# Patient Record
Sex: FEMALE | Race: WHITE | NOT HISPANIC OR LATINO | URBAN - METROPOLITAN AREA
[De-identification: names, ages, dates, MRNs, and addresses within clinical notes are randomized per-mention and may not be internally consistent; named-entity substitution may affect disease eponyms.]

---

## 2017-07-21 ENCOUNTER — EMERGENCY (EMERGENCY)
Facility: HOSPITAL | Age: 72
LOS: 1 days | Discharge: HOME | End: 2017-07-21
Attending: EMERGENCY MEDICINE
Payer: MEDICARE

## 2017-07-21 VITALS
DIASTOLIC BLOOD PRESSURE: 89 MMHG | SYSTOLIC BLOOD PRESSURE: 111 MMHG | HEART RATE: 101 BPM | HEIGHT: 62 IN | RESPIRATION RATE: 16 BRPM | TEMPERATURE: 99 F | WEIGHT: 119.93 LBS | OXYGEN SATURATION: 98 %
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
## 2017-07-21 DIAGNOSIS — Z90.710 ACQUIRED ABSENCE OF BOTH CERVIX AND UTERUS: Chronic | ICD-10-CM

## 2017-07-21 DIAGNOSIS — F15.90 OTHER STIMULANT USE, UNSPECIFIED, UNCOMPLICATED: ICD-10-CM

## 2017-07-21 DIAGNOSIS — Z90.710 ACQUIRED ABSENCE OF BOTH CERVIX AND UTERUS: ICD-10-CM

## 2017-07-21 DIAGNOSIS — S60.471A: ICD-10-CM

## 2017-07-21 DIAGNOSIS — F11.90 OPIOID USE, UNSPECIFIED, UNCOMPLICATED: ICD-10-CM

## 2017-07-21 PROCEDURE — 90471 IMMUNIZATION ADMIN: CPT

## 2017-07-21 PROCEDURE — 99283 EMERGENCY DEPT VISIT LOW MDM: CPT | Mod: 25

## 2017-07-21 PROCEDURE — 99284 EMERGENCY DEPT VISIT MOD MDM: CPT

## 2017-07-21 PROCEDURE — 90715 TDAP VACCINE 7 YRS/> IM: CPT

## 2017-07-21 RX ORDER — TETANUS TOXOID, REDUCED DIPHTHERIA TOXOID AND ACELLULAR PERTUSSIS VACCINE, ADSORBED 5; 2.5; 8; 8; 2.5 [IU]/.5ML; [IU]/.5ML; UG/.5ML; UG/.5ML; UG/.5ML
0.5 SUSPENSION INTRAMUSCULAR ONCE
Qty: 0 | Refills: 0 | Status: COMPLETED | OUTPATIENT
Start: 2017-07-21 | End: 2017-07-21

## 2017-07-21 RX ADMIN — TETANUS TOXOID, REDUCED DIPHTHERIA TOXOID AND ACELLULAR PERTUSSIS VACCINE, ADSORBED 0.5 MILLILITER(S): 5; 2.5; 8; 8; 2.5 SUSPENSION INTRAMUSCULAR at 09:45
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
## 2017-07-21 NOTE — ED PROVIDER NOTE - PHYSICAL EXAMINATION
SKIN: 0.25 x 0.25 superficial abrasion to L 2nd digit distal tip, no active bleeding, no erythema, no ttp.

## 2017-07-21 NOTE — ED PROVIDER NOTE - MUSCULOSKELETAL, MLM
Spine appears normal, range of motion of all joints in the hand is not limited, no muscle or joint tenderness
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
## 2017-07-21 NOTE — ED PROVIDER NOTE - SKIN, MLM
Skin normal color for race, warm, dry and intact. No evidence of rash. Skin normal color for race, warm, dry and intact. No evidence of rash. See above
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
## 2017-08-10 ENCOUNTER — EMERGENCY (EMERGENCY)
Facility: HOSPITAL | Age: 72
LOS: 1 days | Discharge: HOME | End: 2017-08-10
Attending: EMERGENCY MEDICINE
Payer: MEDICARE

## 2017-08-10 VITALS
HEIGHT: 62 IN | WEIGHT: 121.92 LBS | HEART RATE: 94 BPM | TEMPERATURE: 98 F | SYSTOLIC BLOOD PRESSURE: 122 MMHG | DIASTOLIC BLOOD PRESSURE: 68 MMHG | OXYGEN SATURATION: 97 % | RESPIRATION RATE: 20 BRPM

## 2017-08-10 VITALS
OXYGEN SATURATION: 99 % | SYSTOLIC BLOOD PRESSURE: 115 MMHG | HEART RATE: 95 BPM | DIASTOLIC BLOOD PRESSURE: 64 MMHG | TEMPERATURE: 98 F | RESPIRATION RATE: 18 BRPM

## 2017-08-10 DIAGNOSIS — Z90.710 ACQUIRED ABSENCE OF BOTH CERVIX AND UTERUS: ICD-10-CM

## 2017-08-10 DIAGNOSIS — Z88.8 ALLERGY STATUS TO OTHER DRUGS, MEDICAMENTS AND BIOLOGICAL SUBSTANCES STATUS: ICD-10-CM

## 2017-08-10 DIAGNOSIS — R10.9 UNSPECIFIED ABDOMINAL PAIN: ICD-10-CM

## 2017-08-10 DIAGNOSIS — M79.89 OTHER SPECIFIED SOFT TISSUE DISORDERS: ICD-10-CM

## 2017-08-10 DIAGNOSIS — Z90.710 ACQUIRED ABSENCE OF BOTH CERVIX AND UTERUS: Chronic | ICD-10-CM

## 2017-08-10 DIAGNOSIS — R06.02 SHORTNESS OF BREATH: ICD-10-CM

## 2017-08-10 LAB
ALBUMIN SERPL ELPH-MCNC: 3.2 G/DL — LOW (ref 3.5–5)
ALP SERPL-CCNC: 87 U/L — SIGNIFICANT CHANGE UP (ref 40–120)
ALT FLD-CCNC: 16 U/L DA — SIGNIFICANT CHANGE UP (ref 10–60)
ANION GAP SERPL CALC-SCNC: 1 MMOL/L — LOW (ref 5–17)
APPEARANCE UR: CLEAR — SIGNIFICANT CHANGE UP
AST SERPL-CCNC: 16 U/L — SIGNIFICANT CHANGE UP (ref 10–40)
BILIRUB SERPL-MCNC: 0.1 MG/DL — LOW (ref 0.2–1.2)
BILIRUB UR-MCNC: NEGATIVE — SIGNIFICANT CHANGE UP
BUN SERPL-MCNC: 16 MG/DL — SIGNIFICANT CHANGE UP (ref 7–18)
CALCIUM SERPL-MCNC: 8.8 MG/DL — SIGNIFICANT CHANGE UP (ref 8.4–10.5)
CHLORIDE SERPL-SCNC: 105 MMOL/L — SIGNIFICANT CHANGE UP (ref 96–108)
CO2 SERPL-SCNC: 32 MMOL/L — HIGH (ref 22–31)
COLOR SPEC: YELLOW — SIGNIFICANT CHANGE UP
CREAT SERPL-MCNC: 0.51 MG/DL — SIGNIFICANT CHANGE UP (ref 0.5–1.3)
DIFF PNL FLD: ABNORMAL
GLUCOSE SERPL-MCNC: 76 MG/DL — SIGNIFICANT CHANGE UP (ref 70–99)
GLUCOSE UR QL: NEGATIVE — SIGNIFICANT CHANGE UP
HCT VFR BLD CALC: 37.6 % — SIGNIFICANT CHANGE UP (ref 34.5–45)
HGB BLD-MCNC: 12.2 G/DL — SIGNIFICANT CHANGE UP (ref 11.5–15.5)
KETONES UR-MCNC: NEGATIVE — SIGNIFICANT CHANGE UP
LEUKOCYTE ESTERASE UR-ACNC: ABNORMAL
MCHC RBC-ENTMCNC: 32.6 GM/DL — SIGNIFICANT CHANGE UP (ref 32–36)
MCHC RBC-ENTMCNC: 33 PG — SIGNIFICANT CHANGE UP (ref 27–34)
MCV RBC AUTO: 101.1 FL — HIGH (ref 80–100)
NITRITE UR-MCNC: NEGATIVE — SIGNIFICANT CHANGE UP
NT-PROBNP SERPL-SCNC: 79 PG/ML — SIGNIFICANT CHANGE UP (ref 0–125)
PH UR: 8 — SIGNIFICANT CHANGE UP (ref 5–8)
PLATELET # BLD AUTO: 232 K/UL — SIGNIFICANT CHANGE UP (ref 150–400)
POTASSIUM SERPL-MCNC: 4.5 MMOL/L — SIGNIFICANT CHANGE UP (ref 3.5–5.3)
POTASSIUM SERPL-SCNC: 4.5 MMOL/L — SIGNIFICANT CHANGE UP (ref 3.5–5.3)
PROT SERPL-MCNC: 6.8 G/DL — SIGNIFICANT CHANGE UP (ref 6–8.3)
PROT UR-MCNC: NEGATIVE — SIGNIFICANT CHANGE UP
RBC # BLD: 3.72 M/UL — LOW (ref 3.8–5.2)
RBC # FLD: 12.3 % — SIGNIFICANT CHANGE UP (ref 10.3–14.5)
SODIUM SERPL-SCNC: 138 MMOL/L — SIGNIFICANT CHANGE UP (ref 135–145)
SP GR SPEC: 1.01 — SIGNIFICANT CHANGE UP (ref 1.01–1.02)
TROPONIN I SERPL-MCNC: <0.015 NG/ML — SIGNIFICANT CHANGE UP (ref 0–0.04)
UROBILINOGEN FLD QL: NEGATIVE — SIGNIFICANT CHANGE UP
WBC # BLD: 4 K/UL — SIGNIFICANT CHANGE UP (ref 3.8–10.5)
WBC # FLD AUTO: 4 K/UL — SIGNIFICANT CHANGE UP (ref 3.8–10.5)

## 2017-08-10 PROCEDURE — 85027 COMPLETE CBC AUTOMATED: CPT

## 2017-08-10 PROCEDURE — 36000 PLACE NEEDLE IN VEIN: CPT

## 2017-08-10 PROCEDURE — 84484 ASSAY OF TROPONIN QUANT: CPT

## 2017-08-10 PROCEDURE — 83880 ASSAY OF NATRIURETIC PEPTIDE: CPT

## 2017-08-10 PROCEDURE — 80053 COMPREHEN METABOLIC PANEL: CPT

## 2017-08-10 PROCEDURE — 71046 X-RAY EXAM CHEST 2 VIEWS: CPT

## 2017-08-10 PROCEDURE — 99285 EMERGENCY DEPT VISIT HI MDM: CPT | Mod: 25

## 2017-08-10 PROCEDURE — 71020: CPT | Mod: 26

## 2017-08-10 PROCEDURE — 81001 URINALYSIS AUTO W/SCOPE: CPT

## 2017-08-10 PROCEDURE — 99283 EMERGENCY DEPT VISIT LOW MDM: CPT

## 2017-08-10 PROCEDURE — 93005 ELECTROCARDIOGRAM TRACING: CPT

## 2017-08-10 RX ORDER — ONDANSETRON 8 MG/1
1 TABLET, FILM COATED ORAL
Qty: 28 | Refills: 0
Start: 2017-08-10 | End: 2017-08-17

## 2017-08-10 RX ORDER — CEFUROXIME AXETIL 250 MG
1 TABLET ORAL
Qty: 20 | Refills: 0
Start: 2017-08-10 | End: 2017-08-20

## 2017-08-10 RX ORDER — SODIUM CHLORIDE 9 MG/ML
1000 INJECTION INTRAMUSCULAR; INTRAVENOUS; SUBCUTANEOUS ONCE
Qty: 0 | Refills: 0 | Status: COMPLETED | OUTPATIENT
Start: 2017-08-10 | End: 2017-08-10

## 2017-08-10 RX ORDER — CEFUROXIME AXETIL 250 MG
250 TABLET ORAL EVERY 12 HOURS
Qty: 0 | Refills: 0 | Status: DISCONTINUED | OUTPATIENT
Start: 2017-08-10 | End: 2017-08-14

## 2017-08-10 RX ADMIN — SODIUM CHLORIDE 4000 MILLILITER(S): 9 INJECTION INTRAMUSCULAR; INTRAVENOUS; SUBCUTANEOUS at 16:05

## 2017-08-10 RX ADMIN — Medication 250 MILLIGRAM(S): at 16:46

## 2017-08-10 NOTE — ED PROVIDER NOTE - MEDICAL DECISION MAKING DETAILS
left flank cramping, dysuria, comfortable appearing, normal exam, ua and labs and cxr reassuring. pain improved with nsaids. will treat for uti given pt having dysuria even though ua neg. pt very comfortable and unlikely having kidney stone. no cough or fever, unlikely pna. discussed anticipatory guidance. follow up with pmd within one week

## 2017-08-10 NOTE — ED PROVIDER NOTE - OBJECTIVE STATEMENT
70 y/o F pt with no significant PMHx presents to ED c/o shortness of breath with swelling to her b/l lower extremities x last night. Pt is also c/o left sided flank pain and is concerned of a recurring UTI. Pt notes she has been Augmentin for her UTI without any relief. Pt denies fever, chills, cough, chest pain, palpitations, nausea, vomiting, diarrhea, abd pain, dysuria, urinary frequency, hematuria, or any other complaints. ALLERGIES: Antihistamines (Anaphylaxis), PHARMACY - [facility] on [facility] [id number] 72 y/o F pt with no significant PMHx presents to ED c/o shortness of breath with swelling to her b/l lower extremities x last night. Pt is also c/o left sided flank discomfort, dysuria and is concerned of a recurring UTI. Pt notes she has completed a course of Augmentin for her UTI without any relief. Pt denies fever, chills, cough, chest pain, palpitations, nausea, vomiting, diarrhea, hematuria, or any other complaints. ALLERGIES: Antihistamines PHARMACY - [facility] on [facility] [id number]

## 2017-08-10 NOTE — ED ADULT NURSE NOTE - OBJECTIVE STATEMENT
Patient presents to ED with swelling to bilateral lower legs, shortness of breath x yesterday. Patient also complains of UTI, was treated for UTI. Denies chest pain, fever, chills. Ambulates with steady gait. Patient is non diaphoretic. Breathing easy and unlabored, no use of accessory muscles. Family at bedside.

## 2017-08-10 NOTE — ED PROVIDER NOTE - CHPI ED SYMPTOMS NEG
no cough/no chills/no fever/no chest pain/no palpitations, no nausea, no vomiting, no diarrhea, no abd pain, no dysuria, no urinary frequency, no hematuria

## 2019-10-24 NOTE — ED ADULT NURSE NOTE - NS ED NURSE RECORD ANOTHER VITAL SIGN
Yes Medical Necessity Information: It is in your best interest to select a reason for this procedure from the list below. All of these items fulfill various CMS LCD requirements except lesion extends to a margin.

## 2019-11-01 ENCOUNTER — OUTPATIENT (OUTPATIENT)
Dept: OUTPATIENT SERVICES | Facility: HOSPITAL | Age: 74
LOS: 1 days | End: 2019-11-01
Payer: MEDICARE

## 2019-11-01 DIAGNOSIS — Z90.710 ACQUIRED ABSENCE OF BOTH CERVIX AND UTERUS: Chronic | ICD-10-CM

## 2019-11-01 PROCEDURE — G9001: CPT

## 2019-11-20 ENCOUNTER — INPATIENT (INPATIENT)
Facility: HOSPITAL | Age: 74
LOS: 2 days | Discharge: HOME | DRG: 935 | End: 2019-11-23
Attending: INTERNAL MEDICINE | Admitting: INTERNAL MEDICINE
Payer: MEDICARE

## 2019-11-20 VITALS
RESPIRATION RATE: 24 BRPM | SYSTOLIC BLOOD PRESSURE: 142 MMHG | WEIGHT: 115.08 LBS | DIASTOLIC BLOOD PRESSURE: 92 MMHG | TEMPERATURE: 101 F | HEART RATE: 132 BPM | OXYGEN SATURATION: 96 % | HEIGHT: 62 IN

## 2019-11-20 DIAGNOSIS — A41.9 SEPSIS, UNSPECIFIED ORGANISM: ICD-10-CM

## 2019-11-20 DIAGNOSIS — Z29.9 ENCOUNTER FOR PROPHYLACTIC MEASURES, UNSPECIFIED: ICD-10-CM

## 2019-11-20 DIAGNOSIS — F41.9 ANXIETY DISORDER, UNSPECIFIED: ICD-10-CM

## 2019-11-20 DIAGNOSIS — L03.115 CELLULITIS OF RIGHT LOWER LIMB: ICD-10-CM

## 2019-11-20 DIAGNOSIS — Z90.710 ACQUIRED ABSENCE OF BOTH CERVIX AND UTERUS: Chronic | ICD-10-CM

## 2019-11-20 DIAGNOSIS — T24.201A BURN OF SECOND DEGREE OF UNSPECIFIED SITE OF RIGHT LOWER LIMB, EXCEPT ANKLE AND FOOT, INITIAL ENCOUNTER: ICD-10-CM

## 2019-11-20 LAB
ALBUMIN SERPL ELPH-MCNC: 3.2 G/DL — LOW (ref 3.5–5)
ALP SERPL-CCNC: 101 U/L — SIGNIFICANT CHANGE UP (ref 40–120)
ALT FLD-CCNC: 18 U/L DA — SIGNIFICANT CHANGE UP (ref 10–60)
ANION GAP SERPL CALC-SCNC: 6 MMOL/L — SIGNIFICANT CHANGE UP (ref 5–17)
APPEARANCE UR: CLEAR — SIGNIFICANT CHANGE UP
APTT BLD: 28.7 SEC — SIGNIFICANT CHANGE UP (ref 27.5–36.3)
AST SERPL-CCNC: 17 U/L — SIGNIFICANT CHANGE UP (ref 10–40)
BACTERIA # UR AUTO: ABNORMAL /HPF
BASOPHILS # BLD AUTO: 0.02 K/UL — SIGNIFICANT CHANGE UP (ref 0–0.2)
BASOPHILS NFR BLD AUTO: 0.1 % — SIGNIFICANT CHANGE UP (ref 0–2)
BILIRUB SERPL-MCNC: 0.2 MG/DL — SIGNIFICANT CHANGE UP (ref 0.2–1.2)
BILIRUB UR-MCNC: NEGATIVE — SIGNIFICANT CHANGE UP
BUN SERPL-MCNC: 16 MG/DL — SIGNIFICANT CHANGE UP (ref 7–18)
CALCIUM SERPL-MCNC: 9.2 MG/DL — SIGNIFICANT CHANGE UP (ref 8.4–10.5)
CHLORIDE SERPL-SCNC: 105 MMOL/L — SIGNIFICANT CHANGE UP (ref 96–108)
CO2 SERPL-SCNC: 26 MMOL/L — SIGNIFICANT CHANGE UP (ref 22–31)
COLOR SPEC: YELLOW — SIGNIFICANT CHANGE UP
COMMENT - URINE: SIGNIFICANT CHANGE UP
CREAT SERPL-MCNC: 0.59 MG/DL — SIGNIFICANT CHANGE UP (ref 0.5–1.3)
DIFF PNL FLD: ABNORMAL
EOSINOPHIL # BLD AUTO: 0.03 K/UL — SIGNIFICANT CHANGE UP (ref 0–0.5)
EOSINOPHIL NFR BLD AUTO: 0.2 % — SIGNIFICANT CHANGE UP (ref 0–6)
EPI CELLS # UR: SIGNIFICANT CHANGE UP /HPF
GLUCOSE SERPL-MCNC: 112 MG/DL — HIGH (ref 70–99)
GLUCOSE UR QL: NEGATIVE — SIGNIFICANT CHANGE UP
HCT VFR BLD CALC: 42.8 % — SIGNIFICANT CHANGE UP (ref 34.5–45)
HGB BLD-MCNC: 13.9 G/DL — SIGNIFICANT CHANGE UP (ref 11.5–15.5)
IMM GRANULOCYTES NFR BLD AUTO: 0.3 % — SIGNIFICANT CHANGE UP (ref 0–1.5)
INR BLD: 1.06 RATIO — SIGNIFICANT CHANGE UP (ref 0.88–1.16)
KETONES UR-MCNC: NEGATIVE — SIGNIFICANT CHANGE UP
LACTATE SERPL-SCNC: 1.2 MMOL/L — SIGNIFICANT CHANGE UP (ref 0.7–2)
LEUKOCYTE ESTERASE UR-ACNC: NEGATIVE — SIGNIFICANT CHANGE UP
LYMPHOCYTES # BLD AUTO: 0.77 K/UL — LOW (ref 1–3.3)
LYMPHOCYTES # BLD AUTO: 4.8 % — LOW (ref 13–44)
MCHC RBC-ENTMCNC: 32.5 GM/DL — SIGNIFICANT CHANGE UP (ref 32–36)
MCHC RBC-ENTMCNC: 32.5 PG — SIGNIFICANT CHANGE UP (ref 27–34)
MCV RBC AUTO: 100 FL — SIGNIFICANT CHANGE UP (ref 80–100)
MONOCYTES # BLD AUTO: 1.11 K/UL — HIGH (ref 0–0.9)
MONOCYTES NFR BLD AUTO: 6.9 % — SIGNIFICANT CHANGE UP (ref 2–14)
NEUTROPHILS # BLD AUTO: 14.22 K/UL — HIGH (ref 1.8–7.4)
NEUTROPHILS NFR BLD AUTO: 87.7 % — HIGH (ref 43–77)
NITRITE UR-MCNC: NEGATIVE — SIGNIFICANT CHANGE UP
NRBC # BLD: 0 /100 WBCS — SIGNIFICANT CHANGE UP (ref 0–0)
PH UR: 6 — SIGNIFICANT CHANGE UP (ref 5–8)
PLATELET # BLD AUTO: 230 K/UL — SIGNIFICANT CHANGE UP (ref 150–400)
POTASSIUM SERPL-MCNC: 3.9 MMOL/L — SIGNIFICANT CHANGE UP (ref 3.5–5.3)
POTASSIUM SERPL-SCNC: 3.9 MMOL/L — SIGNIFICANT CHANGE UP (ref 3.5–5.3)
PROT SERPL-MCNC: 7.3 G/DL — SIGNIFICANT CHANGE UP (ref 6–8.3)
PROT UR-MCNC: NEGATIVE — SIGNIFICANT CHANGE UP
PROTHROM AB SERPL-ACNC: 11.8 SEC — SIGNIFICANT CHANGE UP (ref 10–12.9)
RBC # BLD: 4.28 M/UL — SIGNIFICANT CHANGE UP (ref 3.8–5.2)
RBC # FLD: 13.1 % — SIGNIFICANT CHANGE UP (ref 10.3–14.5)
RBC CASTS # UR COMP ASSIST: ABNORMAL /HPF (ref 0–2)
SODIUM SERPL-SCNC: 137 MMOL/L — SIGNIFICANT CHANGE UP (ref 135–145)
SP GR SPEC: 1.01 — SIGNIFICANT CHANGE UP (ref 1.01–1.02)
UROBILINOGEN FLD QL: NEGATIVE — SIGNIFICANT CHANGE UP
WBC # BLD: 16.2 K/UL — HIGH (ref 3.8–10.5)
WBC # FLD AUTO: 16.2 K/UL — HIGH (ref 3.8–10.5)
WBC UR QL: SIGNIFICANT CHANGE UP /HPF (ref 0–5)

## 2019-11-20 PROCEDURE — 73590 X-RAY EXAM OF LOWER LEG: CPT | Mod: 26,RT

## 2019-11-20 PROCEDURE — 93010 ELECTROCARDIOGRAM REPORT: CPT

## 2019-11-20 PROCEDURE — 99285 EMERGENCY DEPT VISIT HI MDM: CPT

## 2019-11-20 RX ORDER — ACETAMINOPHEN 500 MG
1000 TABLET ORAL ONCE
Refills: 0 | Status: COMPLETED | OUTPATIENT
Start: 2019-11-20 | End: 2019-11-20

## 2019-11-20 RX ORDER — SODIUM CHLORIDE 9 MG/ML
1000 INJECTION INTRAMUSCULAR; INTRAVENOUS; SUBCUTANEOUS ONCE
Refills: 0 | Status: COMPLETED | OUTPATIENT
Start: 2019-11-20 | End: 2019-11-20

## 2019-11-20 RX ORDER — ENOXAPARIN SODIUM 100 MG/ML
40 INJECTION SUBCUTANEOUS DAILY
Refills: 0 | Status: DISCONTINUED | OUTPATIENT
Start: 2019-11-20 | End: 2019-11-23

## 2019-11-20 RX ORDER — ACETAMINOPHEN 500 MG
650 TABLET ORAL EVERY 6 HOURS
Refills: 0 | Status: DISCONTINUED | OUTPATIENT
Start: 2019-11-20 | End: 2019-11-23

## 2019-11-20 RX ORDER — DIAZEPAM 5 MG
10 TABLET ORAL
Refills: 0 | Status: DISCONTINUED | OUTPATIENT
Start: 2019-11-20 | End: 2019-11-20

## 2019-11-20 RX ORDER — AMPICILLIN SODIUM AND SULBACTAM SODIUM 250; 125 MG/ML; MG/ML
1.5 INJECTION, POWDER, FOR SUSPENSION INTRAMUSCULAR; INTRAVENOUS EVERY 6 HOURS
Refills: 0 | Status: DISCONTINUED | OUTPATIENT
Start: 2019-11-20 | End: 2019-11-23

## 2019-11-20 RX ORDER — VANCOMYCIN HCL 1 G
750 VIAL (EA) INTRAVENOUS ONCE
Refills: 0 | Status: COMPLETED | OUTPATIENT
Start: 2019-11-20 | End: 2019-11-20

## 2019-11-20 RX ORDER — KETOROLAC TROMETHAMINE 30 MG/ML
15 SYRINGE (ML) INJECTION ONCE
Refills: 0 | Status: DISCONTINUED | OUTPATIENT
Start: 2019-11-20 | End: 2019-11-20

## 2019-11-20 RX ADMIN — Medication 1 APPLICATION(S): at 15:23

## 2019-11-20 RX ADMIN — SODIUM CHLORIDE 1000 MILLILITER(S): 9 INJECTION INTRAMUSCULAR; INTRAVENOUS; SUBCUTANEOUS at 13:11

## 2019-11-20 RX ADMIN — SODIUM CHLORIDE 1000 MILLILITER(S): 9 INJECTION INTRAMUSCULAR; INTRAVENOUS; SUBCUTANEOUS at 12:32

## 2019-11-20 RX ADMIN — Medication 400 MILLIGRAM(S): at 12:43

## 2019-11-20 RX ADMIN — Medication 1000 MILLIGRAM(S): at 13:11

## 2019-11-20 RX ADMIN — Medication 15 MILLIGRAM(S): at 15:53

## 2019-11-20 RX ADMIN — Medication 15 MILLIGRAM(S): at 15:16

## 2019-11-20 RX ADMIN — Medication 250 MILLIGRAM(S): at 15:16
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
## 2019-11-20 NOTE — H&P ADULT - NSHPSOCIALHISTORY_GEN_ALL_CORE
Pt lives with son. She is a retired teacher. She is a former smoker, Quit at the age of 28 years old.

## 2019-11-20 NOTE — H&P ADULT - SKIN COMMENTS
Redness on right leg around burn site
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
## 2019-11-20 NOTE — H&P ADULT - PROBLEM SELECTOR PLAN 4
IMPROVE VTE Individual Risk Assessment   RISK                                                          Points  [  ] Previous VTE                                                3  [  ] Thrombophilia                                             2  [  ] Lower limb paralysis                                    2        (unable to hold up >15 seconds)    [  ] Current Cancer                                             2         (within 6 months)  [  x] Immobilization > 24 hrs                              1  [  ] ICU/CCU stay > 24 hours                            1  [x  ] Age > 60                                                    1  IMPROVE VTE Score _________2  Lovenox for DVT prophylaxis
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
## 2019-11-20 NOTE — H&P ADULT - NEUROLOGICAL DETAILS
responds to pain/sensation intact/deep reflexes intact/cranial nerves intact/normal strength/responds to verbal commands/alert and oriented x 3

## 2019-11-21 DIAGNOSIS — Z71.89 OTHER SPECIFIED COUNSELING: ICD-10-CM

## 2019-11-21 LAB
24R-OH-CALCIDIOL SERPL-MCNC: 17.9 NG/ML — LOW (ref 30–80)
ANION GAP SERPL CALC-SCNC: 3 MMOL/L — LOW (ref 5–17)
BUN SERPL-MCNC: 10 MG/DL — SIGNIFICANT CHANGE UP (ref 7–18)
CALCIUM SERPL-MCNC: 9.1 MG/DL — SIGNIFICANT CHANGE UP (ref 8.4–10.5)
CHLORIDE SERPL-SCNC: 108 MMOL/L — SIGNIFICANT CHANGE UP (ref 96–108)
CHOLEST SERPL-MCNC: 150 MG/DL — SIGNIFICANT CHANGE UP (ref 10–199)
CO2 SERPL-SCNC: 29 MMOL/L — SIGNIFICANT CHANGE UP (ref 22–31)
CREAT SERPL-MCNC: 0.45 MG/DL — LOW (ref 0.5–1.3)
GLUCOSE SERPL-MCNC: 97 MG/DL — SIGNIFICANT CHANGE UP (ref 70–99)
HBA1C BLD-MCNC: 5.4 % — SIGNIFICANT CHANGE UP (ref 4–5.6)
HCT VFR BLD CALC: 37 % — SIGNIFICANT CHANGE UP (ref 34.5–45)
HDLC SERPL-MCNC: 65 MG/DL — SIGNIFICANT CHANGE UP
HGB BLD-MCNC: 11.9 G/DL — SIGNIFICANT CHANGE UP (ref 11.5–15.5)
LIPID PNL WITH DIRECT LDL SERPL: 76 MG/DL — SIGNIFICANT CHANGE UP
MAGNESIUM SERPL-MCNC: 2.1 MG/DL — SIGNIFICANT CHANGE UP (ref 1.6–2.6)
MCHC RBC-ENTMCNC: 32.1 PG — SIGNIFICANT CHANGE UP (ref 27–34)
MCHC RBC-ENTMCNC: 32.2 GM/DL — SIGNIFICANT CHANGE UP (ref 32–36)
MCV RBC AUTO: 99.7 FL — SIGNIFICANT CHANGE UP (ref 80–100)
NRBC # BLD: 0 /100 WBCS — SIGNIFICANT CHANGE UP (ref 0–0)
PHOSPHATE SERPL-MCNC: 3 MG/DL — SIGNIFICANT CHANGE UP (ref 2.5–4.5)
PLATELET # BLD AUTO: 183 K/UL — SIGNIFICANT CHANGE UP (ref 150–400)
POTASSIUM SERPL-MCNC: 4.4 MMOL/L — SIGNIFICANT CHANGE UP (ref 3.5–5.3)
POTASSIUM SERPL-SCNC: 4.4 MMOL/L — SIGNIFICANT CHANGE UP (ref 3.5–5.3)
RBC # BLD: 3.71 M/UL — LOW (ref 3.8–5.2)
RBC # FLD: 13.2 % — SIGNIFICANT CHANGE UP (ref 10.3–14.5)
SODIUM SERPL-SCNC: 140 MMOL/L — SIGNIFICANT CHANGE UP (ref 135–145)
TOTAL CHOLESTEROL/HDL RATIO MEASUREMENT: 2.3 RATIO — LOW (ref 3.3–7.1)
TRIGL SERPL-MCNC: 45 MG/DL — SIGNIFICANT CHANGE UP (ref 10–149)
TSH SERPL-MCNC: 0.36 UU/ML — SIGNIFICANT CHANGE UP (ref 0.34–4.82)
WBC # BLD: 9.02 K/UL — SIGNIFICANT CHANGE UP (ref 3.8–10.5)
WBC # FLD AUTO: 9.02 K/UL — SIGNIFICANT CHANGE UP (ref 3.8–10.5)

## 2019-11-21 RX ORDER — DIAZEPAM 5 MG
10 TABLET ORAL
Refills: 0 | Status: DISCONTINUED | OUTPATIENT
Start: 2019-11-21 | End: 2019-11-21

## 2019-11-21 RX ORDER — DIAZEPAM 5 MG
1 TABLET ORAL
Refills: 0 | Status: DISCONTINUED | OUTPATIENT
Start: 2019-11-21 | End: 2019-11-21

## 2019-11-21 RX ORDER — ERGOCALCIFEROL 1.25 MG/1
50000 CAPSULE ORAL
Refills: 0 | Status: DISCONTINUED | OUTPATIENT
Start: 2019-11-21 | End: 2019-11-23

## 2019-11-21 RX ORDER — SERTRALINE 25 MG/1
100 TABLET, FILM COATED ORAL DAILY
Refills: 0 | Status: DISCONTINUED | OUTPATIENT
Start: 2019-11-21 | End: 2019-11-21

## 2019-11-21 RX ADMIN — AMPICILLIN SODIUM AND SULBACTAM SODIUM 100 GRAM(S): 250; 125 INJECTION, POWDER, FOR SUSPENSION INTRAMUSCULAR; INTRAVENOUS at 00:34

## 2019-11-21 RX ADMIN — Medication 650 MILLIGRAM(S): at 08:10

## 2019-11-21 RX ADMIN — Medication 1 APPLICATION(S): at 06:07

## 2019-11-21 RX ADMIN — ENOXAPARIN SODIUM 40 MILLIGRAM(S): 100 INJECTION SUBCUTANEOUS at 13:09

## 2019-11-21 RX ADMIN — Medication 1 APPLICATION(S): at 22:33

## 2019-11-21 RX ADMIN — AMPICILLIN SODIUM AND SULBACTAM SODIUM 100 GRAM(S): 250; 125 INJECTION, POWDER, FOR SUSPENSION INTRAMUSCULAR; INTRAVENOUS at 06:07

## 2019-11-21 RX ADMIN — AMPICILLIN SODIUM AND SULBACTAM SODIUM 100 GRAM(S): 250; 125 INJECTION, POWDER, FOR SUSPENSION INTRAMUSCULAR; INTRAVENOUS at 18:53

## 2019-11-21 RX ADMIN — AMPICILLIN SODIUM AND SULBACTAM SODIUM 100 GRAM(S): 250; 125 INJECTION, POWDER, FOR SUSPENSION INTRAMUSCULAR; INTRAVENOUS at 13:09

## 2019-11-21 NOTE — PROGRESS NOTE ADULT - PROBLEM SELECTOR PLAN 2
Silver sulfadiazine cream  Wound care consult  Continue IV antibiotics Silver sulfadiazine cream  Wound care consult  Continue IV antibiotics, may switch to oral
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
## 2019-11-21 NOTE — PROGRESS NOTE ADULT - PROBLEM SELECTOR PLAN 3
Pt takes diazepam at home  Started on lorazepam, but as pt was drowsy will hold on lorazepam Pt takes diazepam and dextroamphetamine at home. Prescribed setraline, zurasidone, however does not take these 2  Started on lorazepam, but as pt was drowsy will hold on lorazepam as not available to inpatient

## 2019-11-21 NOTE — ADVANCED PRACTICE NURSE CONSULT - RECOMMEDATIONS
-Clean the R. Lower Leg wound with normal saline and apply skin prep to the surrounding skin  -Apply Silvadene Cream to the wound bed, cover with an ABD pad, and wrap with an ACE wrap Daily PRN  -Encourage the patient to reposition Q 2hrs using wedges or pillows

## 2019-11-21 NOTE — PATIENT PROFILE ADULT - CENTRAL VENOUS CATHETER/PICC LINE
Chief Complaint   Patient presents with    Vomiting    Diarrhea    Other     work note     Patient states she is here for vomiting (one night) and diarrhea. Patient states diarrhea since last Thursday; today no diarrhea. Patient states she had a somewhat normal bowel movement this morning. Patient states she will need a work note as she has been out sick for three days. no

## 2019-11-22 LAB
ANION GAP SERPL CALC-SCNC: 3 MMOL/L — LOW (ref 5–17)
BUN SERPL-MCNC: 7 MG/DL — SIGNIFICANT CHANGE UP (ref 7–18)
CALCIUM SERPL-MCNC: 8.8 MG/DL — SIGNIFICANT CHANGE UP (ref 8.4–10.5)
CHLORIDE SERPL-SCNC: 107 MMOL/L — SIGNIFICANT CHANGE UP (ref 96–108)
CO2 SERPL-SCNC: 29 MMOL/L — SIGNIFICANT CHANGE UP (ref 22–31)
CREAT SERPL-MCNC: 0.42 MG/DL — LOW (ref 0.5–1.3)
CULTURE RESULTS: SIGNIFICANT CHANGE UP
GLUCOSE SERPL-MCNC: 95 MG/DL — SIGNIFICANT CHANGE UP (ref 70–99)
HCT VFR BLD CALC: 35.8 % — SIGNIFICANT CHANGE UP (ref 34.5–45)
HGB BLD-MCNC: 11.5 G/DL — SIGNIFICANT CHANGE UP (ref 11.5–15.5)
MCHC RBC-ENTMCNC: 32.1 GM/DL — SIGNIFICANT CHANGE UP (ref 32–36)
MCHC RBC-ENTMCNC: 32.2 PG — SIGNIFICANT CHANGE UP (ref 27–34)
MCV RBC AUTO: 100.3 FL — HIGH (ref 80–100)
NRBC # BLD: 0 /100 WBCS — SIGNIFICANT CHANGE UP (ref 0–0)
PLATELET # BLD AUTO: 185 K/UL — SIGNIFICANT CHANGE UP (ref 150–400)
POTASSIUM SERPL-MCNC: 3.8 MMOL/L — SIGNIFICANT CHANGE UP (ref 3.5–5.3)
POTASSIUM SERPL-SCNC: 3.8 MMOL/L — SIGNIFICANT CHANGE UP (ref 3.5–5.3)
RBC # BLD: 3.57 M/UL — LOW (ref 3.8–5.2)
RBC # FLD: 13 % — SIGNIFICANT CHANGE UP (ref 10.3–14.5)
SODIUM SERPL-SCNC: 139 MMOL/L — SIGNIFICANT CHANGE UP (ref 135–145)
SPECIMEN SOURCE: SIGNIFICANT CHANGE UP
WBC # BLD: 7.52 K/UL — SIGNIFICANT CHANGE UP (ref 3.8–10.5)
WBC # FLD AUTO: 7.52 K/UL — SIGNIFICANT CHANGE UP (ref 3.8–10.5)

## 2019-11-22 RX ORDER — SENNA PLUS 8.6 MG/1
2 TABLET ORAL AT BEDTIME
Refills: 0 | Status: DISCONTINUED | OUTPATIENT
Start: 2019-11-22 | End: 2019-11-23

## 2019-11-22 RX ADMIN — SENNA PLUS 2 TABLET(S): 8.6 TABLET ORAL at 21:47

## 2019-11-22 RX ADMIN — Medication 1 APPLICATION(S): at 06:11

## 2019-11-22 RX ADMIN — Medication 1 APPLICATION(S): at 17:13

## 2019-11-22 RX ADMIN — AMPICILLIN SODIUM AND SULBACTAM SODIUM 100 GRAM(S): 250; 125 INJECTION, POWDER, FOR SUSPENSION INTRAMUSCULAR; INTRAVENOUS at 23:18

## 2019-11-22 RX ADMIN — ENOXAPARIN SODIUM 40 MILLIGRAM(S): 100 INJECTION SUBCUTANEOUS at 11:22

## 2019-11-22 RX ADMIN — AMPICILLIN SODIUM AND SULBACTAM SODIUM 100 GRAM(S): 250; 125 INJECTION, POWDER, FOR SUSPENSION INTRAMUSCULAR; INTRAVENOUS at 01:30

## 2019-11-22 RX ADMIN — AMPICILLIN SODIUM AND SULBACTAM SODIUM 100 GRAM(S): 250; 125 INJECTION, POWDER, FOR SUSPENSION INTRAMUSCULAR; INTRAVENOUS at 06:11

## 2019-11-22 RX ADMIN — AMPICILLIN SODIUM AND SULBACTAM SODIUM 100 GRAM(S): 250; 125 INJECTION, POWDER, FOR SUSPENSION INTRAMUSCULAR; INTRAVENOUS at 11:22

## 2019-11-22 RX ADMIN — ERGOCALCIFEROL 50000 UNIT(S): 1.25 CAPSULE ORAL at 11:21

## 2019-11-22 RX ADMIN — AMPICILLIN SODIUM AND SULBACTAM SODIUM 100 GRAM(S): 250; 125 INJECTION, POWDER, FOR SUSPENSION INTRAMUSCULAR; INTRAVENOUS at 17:13
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
## 2019-11-22 NOTE — PROGRESS NOTE ADULT - PROBLEM SELECTOR PLAN 3
Pt takes diazepam and dextroamphetamine at home. Prescribed sertraline, zurasidone, however does not take these 2  Started on lorazepam, but as pt was drowsy will hold on diazepam as not available to inpatient. dextroamphetamine not available to inpatient. Patient advised to take meds from home if needed, however patient will hold for now

## 2019-11-23 ENCOUNTER — TRANSCRIPTION ENCOUNTER (OUTPATIENT)
Age: 74
End: 2019-11-23

## 2019-11-23 VITALS
HEART RATE: 96 BPM | DIASTOLIC BLOOD PRESSURE: 77 MMHG | RESPIRATION RATE: 18 BRPM | OXYGEN SATURATION: 95 % | SYSTOLIC BLOOD PRESSURE: 114 MMHG | TEMPERATURE: 98 F

## 2019-11-23 LAB
HCT VFR BLD CALC: 38.5 % — SIGNIFICANT CHANGE UP (ref 34.5–45)
HGB BLD-MCNC: 12.4 G/DL — SIGNIFICANT CHANGE UP (ref 11.5–15.5)
MCHC RBC-ENTMCNC: 31.9 PG — SIGNIFICANT CHANGE UP (ref 27–34)
MCHC RBC-ENTMCNC: 32.2 GM/DL — SIGNIFICANT CHANGE UP (ref 32–36)
MCV RBC AUTO: 99 FL — SIGNIFICANT CHANGE UP (ref 80–100)
NRBC # BLD: 0 /100 WBCS — SIGNIFICANT CHANGE UP (ref 0–0)
PLATELET # BLD AUTO: 225 K/UL — SIGNIFICANT CHANGE UP (ref 150–400)
RBC # BLD: 3.89 M/UL — SIGNIFICANT CHANGE UP (ref 3.8–5.2)
RBC # FLD: 12.8 % — SIGNIFICANT CHANGE UP (ref 10.3–14.5)
WBC # BLD: 4.8 K/UL — SIGNIFICANT CHANGE UP (ref 3.8–10.5)
WBC # FLD AUTO: 4.8 K/UL — SIGNIFICANT CHANGE UP (ref 3.8–10.5)

## 2019-11-23 PROCEDURE — 83036 HEMOGLOBIN GLYCOSYLATED A1C: CPT

## 2019-11-23 PROCEDURE — 87086 URINE CULTURE/COLONY COUNT: CPT

## 2019-11-23 PROCEDURE — 80053 COMPREHEN METABOLIC PANEL: CPT

## 2019-11-23 PROCEDURE — 80048 BASIC METABOLIC PNL TOTAL CA: CPT

## 2019-11-23 PROCEDURE — 87040 BLOOD CULTURE FOR BACTERIA: CPT

## 2019-11-23 PROCEDURE — 83605 ASSAY OF LACTIC ACID: CPT

## 2019-11-23 PROCEDURE — 83735 ASSAY OF MAGNESIUM: CPT

## 2019-11-23 PROCEDURE — 82306 VITAMIN D 25 HYDROXY: CPT

## 2019-11-23 PROCEDURE — 36415 COLL VENOUS BLD VENIPUNCTURE: CPT

## 2019-11-23 PROCEDURE — 85027 COMPLETE CBC AUTOMATED: CPT

## 2019-11-23 PROCEDURE — 96374 THER/PROPH/DIAG INJ IV PUSH: CPT

## 2019-11-23 PROCEDURE — 80061 LIPID PANEL: CPT

## 2019-11-23 PROCEDURE — 85610 PROTHROMBIN TIME: CPT

## 2019-11-23 PROCEDURE — 81001 URINALYSIS AUTO W/SCOPE: CPT

## 2019-11-23 PROCEDURE — 84443 ASSAY THYROID STIM HORMONE: CPT

## 2019-11-23 PROCEDURE — 93005 ELECTROCARDIOGRAM TRACING: CPT

## 2019-11-23 PROCEDURE — 73590 X-RAY EXAM OF LOWER LEG: CPT

## 2019-11-23 PROCEDURE — 84100 ASSAY OF PHOSPHORUS: CPT

## 2019-11-23 PROCEDURE — 99285 EMERGENCY DEPT VISIT HI MDM: CPT | Mod: 25

## 2019-11-23 PROCEDURE — 85730 THROMBOPLASTIN TIME PARTIAL: CPT

## 2019-11-23 RX ADMIN — Medication 1 APPLICATION(S): at 06:02

## 2019-11-23 RX ADMIN — AMPICILLIN SODIUM AND SULBACTAM SODIUM 100 GRAM(S): 250; 125 INJECTION, POWDER, FOR SUSPENSION INTRAMUSCULAR; INTRAVENOUS at 06:02

## 2019-11-23 NOTE — PROGRESS NOTE ADULT - PROBLEM SELECTOR PLAN 3
Pt takes diazepam and dextroamphetamine at home. Prescribed sertraline, zurasidone, however does not take these 2  Started on lorazepam, but as pt was drowsy will hold on diazepam as not available to inpatient. dextroamphetamine not available to inpatient.
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
## 2019-11-23 NOTE — PROGRESS NOTE ADULT - ATTENDING COMMENTS
Patient seen and examined.   Cellulitis resolving.  D/C planning on Augmentin am tomorrow.
Patient seen and examined.   Cellulitis resolving.  D/C planning on Augmentin am tomorrow.
Patient seen and examined.   Continue with Unasyn. D/C planning in 1-2 days
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
## 2019-11-23 NOTE — DISCHARGE NOTE PROVIDER - NSDCMRMEDTOKEN_GEN_ALL_CORE_FT
dextroamphetamine 10 mg oral tablet: 4 tab(s) orally once a day  diazePAM 10 mg oral tablet: 1 tab(s) orally 2 times a day  K-PHOS tablet: 1 tab(s) orally 3 times a day  sertraline 100 mg oral tablet: 1 tab(s) orally 2 times a day  ziprasidone 20 mg oral capsule: 1 cap(s) orally 2 times a day, with meals amoxicillin-clavulanate 875 mg-125 mg oral tablet: 1 tab(s) orally 2 times a day   dextroamphetamine 10 mg oral tablet: 4 tab(s) orally once a day  diazePAM 10 mg oral tablet: 1 tab(s) orally 2 times a day  K-PHOS tablet: 1 tab(s) orally 3 times a day  sertraline 100 mg oral tablet: 1 tab(s) orally 2 times a day  silver sulfADIAZINE 1% topical cream: 1 application topically 2 times a day  ziprasidone 20 mg oral capsule: 1 cap(s) orally 2 times a day, with meals

## 2019-11-23 NOTE — DISCHARGE NOTE NURSING/CASE MANAGEMENT/SOCIAL WORK - PATIENT PORTAL LINK FT
You can access the FollowMyHealth Patient Portal offered by [facility] by registering at the following website: [email or web address]. By joining [employer]’s FollowMyHealth portal, you will also be able to view your health information using other applications (apps) compatible with our system.
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
## 2019-11-25 LAB
CULTURE RESULTS: SIGNIFICANT CHANGE UP
CULTURE RESULTS: SIGNIFICANT CHANGE UP
SPECIMEN SOURCE: SIGNIFICANT CHANGE UP
SPECIMEN SOURCE: SIGNIFICANT CHANGE UP

## 2019-11-30 ENCOUNTER — EMERGENCY (EMERGENCY)
Facility: HOSPITAL | Age: 74
LOS: 1 days | Discharge: HOME | End: 2019-11-30
Attending: EMERGENCY MEDICINE
Payer: MEDICARE

## 2019-11-30 VITALS
SYSTOLIC BLOOD PRESSURE: 149 MMHG | DIASTOLIC BLOOD PRESSURE: 90 MMHG | WEIGHT: 115.08 LBS | RESPIRATION RATE: 20 BRPM | TEMPERATURE: 98 F | HEART RATE: 113 BPM | HEIGHT: 62 IN | OXYGEN SATURATION: 97 %

## 2019-11-30 VITALS
TEMPERATURE: 97 F | HEART RATE: 98 BPM | OXYGEN SATURATION: 97 % | DIASTOLIC BLOOD PRESSURE: 68 MMHG | SYSTOLIC BLOOD PRESSURE: 121 MMHG | RESPIRATION RATE: 20 BRPM

## 2019-11-30 DIAGNOSIS — Z90.710 ACQUIRED ABSENCE OF BOTH CERVIX AND UTERUS: Chronic | ICD-10-CM

## 2019-11-30 LAB
ALBUMIN SERPL ELPH-MCNC: 3.4 G/DL — LOW (ref 3.5–5)
ALP SERPL-CCNC: 95 U/L — SIGNIFICANT CHANGE UP (ref 40–120)
ALT FLD-CCNC: 23 U/L DA — SIGNIFICANT CHANGE UP (ref 10–60)
ANION GAP SERPL CALC-SCNC: 3 MMOL/L — LOW (ref 5–17)
AST SERPL-CCNC: 24 U/L — SIGNIFICANT CHANGE UP (ref 10–40)
BASOPHILS # BLD AUTO: 0.03 K/UL — SIGNIFICANT CHANGE UP (ref 0–0.2)
BASOPHILS NFR BLD AUTO: 0.3 % — SIGNIFICANT CHANGE UP (ref 0–2)
BILIRUB SERPL-MCNC: 0.2 MG/DL — SIGNIFICANT CHANGE UP (ref 0.2–1.2)
BUN SERPL-MCNC: 16 MG/DL — SIGNIFICANT CHANGE UP (ref 7–18)
CALCIUM SERPL-MCNC: 9.2 MG/DL — SIGNIFICANT CHANGE UP (ref 8.4–10.5)
CHLORIDE SERPL-SCNC: 103 MMOL/L — SIGNIFICANT CHANGE UP (ref 96–108)
CO2 SERPL-SCNC: 33 MMOL/L — HIGH (ref 22–31)
CREAT SERPL-MCNC: 0.53 MG/DL — SIGNIFICANT CHANGE UP (ref 0.5–1.3)
EOSINOPHIL # BLD AUTO: 0.06 K/UL — SIGNIFICANT CHANGE UP (ref 0–0.5)
EOSINOPHIL NFR BLD AUTO: 0.6 % — SIGNIFICANT CHANGE UP (ref 0–6)
GLUCOSE SERPL-MCNC: 87 MG/DL — SIGNIFICANT CHANGE UP (ref 70–99)
HCT VFR BLD CALC: 38.2 % — SIGNIFICANT CHANGE UP (ref 34.5–45)
HGB BLD-MCNC: 12.6 G/DL — SIGNIFICANT CHANGE UP (ref 11.5–15.5)
IMM GRANULOCYTES NFR BLD AUTO: 0.4 % — SIGNIFICANT CHANGE UP (ref 0–1.5)
LACTATE SERPL-SCNC: 0.8 MMOL/L — SIGNIFICANT CHANGE UP (ref 0.7–2)
LYMPHOCYTES # BLD AUTO: 2.72 K/UL — SIGNIFICANT CHANGE UP (ref 1–3.3)
LYMPHOCYTES # BLD AUTO: 27.6 % — SIGNIFICANT CHANGE UP (ref 13–44)
MCHC RBC-ENTMCNC: 32.6 PG — SIGNIFICANT CHANGE UP (ref 27–34)
MCHC RBC-ENTMCNC: 33 GM/DL — SIGNIFICANT CHANGE UP (ref 32–36)
MCV RBC AUTO: 99 FL — SIGNIFICANT CHANGE UP (ref 80–100)
MONOCYTES # BLD AUTO: 0.69 K/UL — SIGNIFICANT CHANGE UP (ref 0–0.9)
MONOCYTES NFR BLD AUTO: 7 % — SIGNIFICANT CHANGE UP (ref 2–14)
NEUTROPHILS # BLD AUTO: 6.31 K/UL — SIGNIFICANT CHANGE UP (ref 1.8–7.4)
NEUTROPHILS NFR BLD AUTO: 64.1 % — SIGNIFICANT CHANGE UP (ref 43–77)
NRBC # BLD: 0 /100 WBCS — SIGNIFICANT CHANGE UP (ref 0–0)
PLATELET # BLD AUTO: 312 K/UL — SIGNIFICANT CHANGE UP (ref 150–400)
POTASSIUM SERPL-MCNC: 4 MMOL/L — SIGNIFICANT CHANGE UP (ref 3.5–5.3)
POTASSIUM SERPL-SCNC: 4 MMOL/L — SIGNIFICANT CHANGE UP (ref 3.5–5.3)
PROT SERPL-MCNC: 7.4 G/DL — SIGNIFICANT CHANGE UP (ref 6–8.3)
RBC # BLD: 3.86 M/UL — SIGNIFICANT CHANGE UP (ref 3.8–5.2)
RBC # FLD: 13 % — SIGNIFICANT CHANGE UP (ref 10.3–14.5)
SODIUM SERPL-SCNC: 139 MMOL/L — SIGNIFICANT CHANGE UP (ref 135–145)
WBC # BLD: 9.85 K/UL — SIGNIFICANT CHANGE UP (ref 3.8–10.5)
WBC # FLD AUTO: 9.85 K/UL — SIGNIFICANT CHANGE UP (ref 3.8–10.5)

## 2019-11-30 PROCEDURE — 73590 X-RAY EXAM OF LOWER LEG: CPT | Mod: 26,RT

## 2019-11-30 PROCEDURE — 83605 ASSAY OF LACTIC ACID: CPT

## 2019-11-30 PROCEDURE — 85027 COMPLETE CBC AUTOMATED: CPT

## 2019-11-30 PROCEDURE — 36415 COLL VENOUS BLD VENIPUNCTURE: CPT

## 2019-11-30 PROCEDURE — 99285 EMERGENCY DEPT VISIT HI MDM: CPT

## 2019-11-30 PROCEDURE — 87040 BLOOD CULTURE FOR BACTERIA: CPT

## 2019-11-30 PROCEDURE — 80053 COMPREHEN METABOLIC PANEL: CPT

## 2019-11-30 PROCEDURE — 73590 X-RAY EXAM OF LOWER LEG: CPT

## 2019-11-30 PROCEDURE — 99283 EMERGENCY DEPT VISIT LOW MDM: CPT | Mod: 25

## 2019-11-30 RX ORDER — SODIUM CHLORIDE 9 MG/ML
1000 INJECTION INTRAMUSCULAR; INTRAVENOUS; SUBCUTANEOUS ONCE
Refills: 0 | Status: COMPLETED | OUTPATIENT
Start: 2019-11-30 | End: 2019-11-30

## 2019-11-30 RX ADMIN — SODIUM CHLORIDE 1000 MILLILITER(S): 9 INJECTION INTRAMUSCULAR; INTRAVENOUS; SUBCUTANEOUS at 16:11

## 2019-11-30 NOTE — ED PROVIDER NOTE - PATIENT PORTAL LINK FT
You can access the FollowMyHealth Patient Portal offered by [facility] by registering at the following website: [email or web address]. By joining [employer]’s FollowMyHealth portal, you will also be able to view your health information using other applications (apps) compatible with our system.

## 2019-11-30 NOTE — ED PROVIDER NOTE - CARE PLAN
Principal Discharge DX:	Wound Principal Discharge DX:	Skin ulcer of right lower leg with fat layer exposed

## 2019-11-30 NOTE — ED PROVIDER NOTE - NSFOLLOWUPCLINICS_GEN_ALL_ED_FT
[facility]  Podiatry/Wound Care  [address] [place] [address]  Phone: [phone]  Fax: [phone]  Follow Up Time: 4-6 Days

## 2019-11-30 NOTE — ED PROVIDER NOTE - PROGRESS NOTE DETAILS
Patient is resting comfortably, NAD. HR normal. Normal WBC count. wound with healthy granulation tissue. No evidence of infection. Nurse provided wound care teaching. Information given to cc to arrange f/u with wound clinic. f/u with PMD in 1-2 days. Return to the ED immediately if getting worse, not improving, or if having any new or troubling symptoms.
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
## 2019-11-30 NOTE — ED PROVIDER NOTE - SKIN WOUND DESCRIPTION
R leg 3x10cm area of healthy granulated tissue, no surrounding erythema or purulence, leg NV intact distal to injury
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
## 2021-11-10 NOTE — ED ADULT TRIAGE NOTE - ESI TRIAGE ACUITY LEVEL, MLM
INFECTIOUS DISEASES CONSULT NOTE    Patient:  Tawny Shin 69 y.o. female  ROOM # 349/1  YOB: 1952  MRN: [id number]  [facility]:  [id number]  Admit date: 11/4/2021   Admitting Physician: Bandar Shea, *  Primary Care Physician: Davon Urrutia MD  REFERRING PROVIDER: Bandar Shea,*      REASON FOR CONSULTATION :Abx management prior to DC      HISTORY OF PRESENT ILLNESS: Patient is a pleasant 69-year-old immunocompromised female who has been followed chronically by Dr. Ohara for her multiple infectious diseases issue including osteomyelitis/discitis L1-L2, right femur infection with methicillin susceptible Staphylococcus aureus and prolonged bacteremia as below..    She has had previous prolonged methicillin susceptible Staphylococcus aureus bacteremia and subsequently completed approximately 12 weeks total of IV antibiotic therapy I believe in May 2021.    The patient was admitted to [facility] in October and had methicillin susceptible Staph aureus bacteremia at that time.  She was actually admitted secondary to a fall.  She was noted to have a fluid collection on the right trochanter and previously had surgery per Dr. Turner  The area was aspirated and appeared serosanguineous however culture subsequently grew methicillin susceptible Staphylococcus aureus.  This was not felt to be the primary source of the staph aureus.  Patient cleared her bacteremia much quicker and when I actually last saw her she was going to go home on IV antibiotic therapy, however she subsequently was transferred to [facility] for rehab.    She developed some increased pain in her hip and weakness of her lower extremities, had MRI obtained that was concerning for epidural hematoma and cord compression.  She was subsequently transferred back to Dr. Shea services.  Her anticoagulation had been held and there has been no surgical intervention.        Past Medical History:  "  Diagnosis Date   • Age-related osteoporosis with current pathological fracture 5/27/2020   • Arthritis    • Asthma    • Bilateral bunions 12/23/2020   • Cancer ([facility])    • Cardiac pacemaker syndrome 12/23/2020    Overview:  - heart block - implanted 11/16   • Charcot's joint of foot, left 12/23/2020   • Chronic deep vein thrombosis (DVT) of right lower extremity ([facility]) 6/23/2021   • Chronic pain syndrome 6/22/2021   • Chronic sinusitis    • COPD (chronic obstructive pulmonary disease) ([facility])    • Coronary artery disease    • 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 12/23/2020   • Elevated cholesterol    • Eustachian tube dysfunction    • Heart disease    • Herpes simplex    • History of transfusion    • Hyperlipidemia    • Hypertension    • Hypothyroidism 12/23/2020   • Intrinsic asthma 12/23/2020   • Knee dislocation    • Labral tear of right hip joint    • Laryngitis sicca    • Laryngitis, chronic    • Left carotid bruit 3/9/2016   • MI (myocardial infarction) ([facility])    • Myalgia due to statin 6/25/2019   • Open wound of right hip 9/14/2021   • Osteomyelitis of right femur ([facility]) 7/6/2021   • Otorrhea    • Pacemaker 11/17/2016   • Primary osteoarthritis of left knee 12/23/2020   • Psoriasis vulgaris 12/23/2020   • S/P coronary artery stent placement 3/9/2016   • Sensorineural hearing loss    • Seropositive rheumatoid arthritis of multiple sites ([facility]) 12/27/2019    Overview:  -myochrysine '93-'96 -methotrexate '96--->11/98;r/s  restarted 2/99--> 8/14 (anemia) -sulfasalazine- not effective -penicillamine 6/98-->10/98; no effect -leflunomide 11/98--> - Humira '13-->didn't take - Enbrel 12/14-->3/15- no effect!   Last Assessment & Plan:  - \"aching all over\" because she had to be off her anti-rheumatic drugs for 2 weeks in preparation for her R knee surgery - he   • Sick sinus syndrome ([facility]) 12/27/2019   • Sjogren's disease ([facility])    • Spondylolisthesis of lumbar region 1/17/2018   • Syncope, recurrent 2/8/2021     Past Surgical " 3 History:   Procedure Laterality Date   • A-V CARDIAC PACEMAKER INSERTION  2016   • ATRIAL CARDIAC PACEMAKER INSERTION     • CARDIAC CATHETERIZATION     • CATARACT EXTRACTION     • CERVICAL CORPECTOMY N/A 3/3/2021    Procedure: CERVICAL 6 CORPECTOMY WITH TITANIUM CAGE WITH NEURO MONITORING;  Surgeon: Bandar Shea MD;  Location:  [facility] OR;  Service: Neurosurgery;  Laterality: N/A;   • COLONOSCOPY  11/08/2011    One fold in the ascending colon which showed ulcer otherwise normal exam   • COLONOSCOPY  11/12/2004    Normal exam repeat in five years   • CORONARY ANGIOPLASTY WITH STENT PLACEMENT      X 2; 2013 & 2014   • ENDOSCOPY  07/10/2014    Normal exam   • FLAP LEG Right 9/14/2021    Procedure: RIGHT GLUTEAL FASCIOCUTANEOUS ADVANCEMENT FLAP AND RIGHT TENSOR FASCIAL JESSICA FLAP;  Surgeon: Amadeo Turner MD;  Location:  [facility] OR;  Service: Plastics;  Laterality: Right;   • HIP ABDUCTION TENOTOMY BILATERAL Right 1/14/2021    Procedure: RIGHT HIP GLUTEUS MEDLUS / MINIMUS REPAIR, POSSIBLE ACHILLES ALLOGRAFT;  Surgeon: Nino Carlson MD;  Location:  [facility];  Service: Orthopedics;  Laterality: Right;   • INCISION AND DRAINAGE HIP Right 2/9/2021    Procedure: HIP INCISION AND DRAINAGE;  Surgeon: Nino Carlson MD;  Location:  [facility];  Service: Orthopedics;  Laterality: Right;   • INCISION AND DRAINAGE LEG Right 10/24/2021    Procedure: INCISION AND DRAINAGE LOWER EXTREMITY;  Surgeon: Amadeo Turner MD;  Location:  [facility];  Service: Plastics;  Laterality: Right;   • INCISION AND DRAINAGE OF WOUND Right 7/8/2021    Procedure: INCISION AND DRAINAGE WOUND RIGHT HIP;  Surgeon: James Huntley MD;  Location:  [facility];  Service: Orthopedics;  Laterality: Right;   • JOINT REPLACEMENT     • KYPHOPLASTY WITH BIOPSY Bilateral 10/26/2021    Procedure: THOARCIC 12 KYPHOPLASTY WITH BIOPSY;  Surgeon: Bandar Shea MD;  Location:  [facility];  Service: Neurosurgery;  Laterality: Bilateral;   • LEG  DEBRIDEMENT Right 9/14/2021    Procedure: DEBRIDEMENT OF RIGHT HIP WOUND, RIGHT GLUTEAL FASCIOCUTANEOUS ADVANCEMENT FLAP AND RIGHT TENSOR FASCIAL JESSICA FLAP;  Surgeon: Amadeo Turner MD;  Location:  [facility];  Service: Plastics;  Laterality: Right;   • LUMBAR DISCECTOMY Right 3/23/2021    Procedure: LUMBAR DISCECTOMY MICRO, Lumbar 1/2 right;  Surgeon: Bandar Shea MD;  Location:  [facility];  Service: Neurosurgery;  Laterality: Right;   • LUMBAR FUSION N/A 1/19/2018    Procedure: L3-4,L4-5 DECOMPRESSION, POSTERIOR SPINAL FUSION WITH INSTRUMENTATION;  Surgeon: Fortino Oropeza MD;  Location:  [facility];  Service:    • LUMBAR LAMINECTOMY WITH FUSION Left 1/17/2018    Procedure: LEFT L3-4 L4-5 LATERAL LUMBAR INTERBODY FUSION;  Surgeon: Fortino Oropeza MD;  Location:  [facility];  Service:    • MYRINGOTOMY W/ TUBES  09/04/2014    TUBES NO LONGER IN PLACE   • OTHER SURGICAL HISTORY      total knee was infected twice so hardware was removed and spacers were placed   • REPLACEMENT TOTAL KNEE Right      Family History   Problem Relation Age of Onset   • Cancer Mother    • Heart disease Father      Social History     Socioeconomic History   • Marital status:    Tobacco Use   • Smoking status: Never Smoker   • Smokeless tobacco: Never Used   Substance and Sexual Activity   • Alcohol use: No   • Drug use: Never   • Sexual activity: Defer           Current Meds:     Current Facility-Administered Medications   Medication Dose Route Frequency Provider Last Rate Last Admin   • acetaminophen (TYLENOL) tablet 650 mg  650 mg Oral Q4H PRN Bandar Shea MD   650 mg at 11/09/21 1207    Or   • acetaminophen (TYLENOL) 160 MG/5ML solution 650 mg  650 mg Oral Q4H PRN Bandar Shea MD        Or   • acetaminophen (TYLENOL) suppository 650 mg  650 mg Rectal Q4H PRN Bandar Shea MD       • sennosides-docusate (PERICOLACE) 8.6-50 MG per tablet 2 tablet  2 tablet Oral BID Bandar Shea  MD Carrillo   2 tablet at 11/10/21 0900    And   • polyethylene glycol (MIRALAX) packet 17 g  17 g Oral Daily PRN Bandar Shea MD        And   • bisacodyl (DULCOLAX) EC tablet 5 mg  5 mg Oral Daily PRN Bandar Shea MD        And   • bisacodyl (DULCOLAX) suppository 10 mg  10 mg Rectal Daily PRN Bandar Shea MD       • carvedilol (COREG) tablet 25 mg  25 mg Oral BID With Meals Bandar Shea MD   25 mg at 11/10/21 0900   • ceFAZolin in 0.9% normal saline (ANCEF) IVPB solution 2 g  2 g Intravenous Q8H Bandar Shea  mL/hr at 11/10/21 0900 2 g at 11/10/21 0900   • docusate sodium (COLACE) capsule 100 mg  100 mg Oral BID Bandar Shea MD   100 mg at 11/10/21 0900   • ferrous sulfate tablet 325 mg  325 mg Oral Daily With Breakfast Bandar Shea MD   325 mg at 11/10/21 0900   • fluticasone (FLONASE) 50 MCG/ACT nasal spray 1 spray  1 spray Nasal BID Bandar Shea MD   1 spray at 11/10/21 0910   • folic acid (FOLVITE) tablet 1 mg  1 mg Oral Daily Bandar Shea MD   1 mg at 11/10/21 0900   • furosemide (LASIX) tablet 40 mg  40 mg Oral Daily Bandar Shea MD   40 mg at 11/10/21 0900   • heparin (porcine) 5000 UNIT/ML injection 5,000 Units  5,000 Units Subcutaneous Q12H Bandar Shea MD   5,000 Units at 11/10/21 0859   • HYDROcodone-acetaminophen (NORCO)  MG per tablet 1 tablet  1 tablet Oral Q4H PRN Bandar Shea MD   1 tablet at 11/10/21 1431   • HYDROcodone-acetaminophen (NORCO) 7.5-325 MG per tablet 1 tablet  1 tablet Oral Q4H PRN Bandar Shea MD   1 tablet at 11/09/21 2036   • hydrocortisone-bacitracin-zinc oxide-nystatin (MAGIC BARRIER) ointment 1 application  1 application Topical PRN Polly Senior, APRN       • influenza vac split quad (FLUZONE,FLUARIX,AFLURIA,FLULAVAL) injection 0.5 mL  0.5 mL Intramuscular During Hospitalization Bandar Shea MD       • isosorbide  mononitrate (IMDUR) 24 hr tablet 60 mg  60 mg Oral BID Bandar Shea MD   60 mg at 11/10/21 0900   • levothyroxine (SYNTHROID, LEVOTHROID) tablet 75 mcg  75 mcg Oral Q AM Bandar Shea MD   75 mcg at 11/10/21 0615   • lidocaine (LIDODERM) 5 % 1 patch  1 patch Transdermal Daily PRN Bandar Shea MD   1 patch at 11/05/21 1216   • magnesium oxide (MAG-OX) tablet 400 mg  400 mg Oral Daily Bandar Shea MD   400 mg at 11/10/21 0900   • nitroglycerin (NITROSTAT) SL tablet 0.4 mg  0.4 mg Sublingual Q5 Min PRN Bandar Shea MD       • ondansetron (ZOFRAN) tablet 4 mg  4 mg Oral Q6H PRN Bandar Shea MD        Or   • ondansetron (ZOFRAN) injection 4 mg  4 mg Intravenous Q6H PRN Bandar Shea MD       • pantoprazole (PROTONIX) EC tablet 40 mg  40 mg Oral Daily Bandar Shea MD   40 mg at 11/10/21 0859   • polyethylene glycol (MIRALAX) packet 17 g  17 g Oral Daily Bandar Shea MD   17 g at 11/10/21 0900   • potassium chloride (MICRO-K) CR capsule 20 mEq  20 mEq Oral BID Bandar Shea MD   20 mEq at 11/10/21 0859   • saccharomyces boulardii (FLORASTOR) capsule 250 mg  250 mg Oral Daily Bandar Shea MD   250 mg at 11/10/21 0900   • sodium chloride 0.9 % flush 10 mL  10 mL Intravenous Q12H Bandar Shea MD   10 mL at 11/09/21 2040   • sodium chloride 0.9 % flush 10 mL  10 mL Intravenous PRN Bandar Shea MD       • sucralfate (CARAFATE) tablet 1 g  1 g Oral 4x Daily AC & at Bedtime Bandar Shea MD   1 g at 11/10/21 1109   • thiamine (VITAMIN B-1) tablet 250 mg  250 mg Oral Daily Bandar Shea MD   250 mg at 11/10/21 0900   • traMADol (ULTRAM) tablet 100 mg  100 mg Oral TID Bandar Shea MD   100 mg at 11/10/21 0859   • valACYclovir (VALTREX) tablet 500 mg  500 mg Oral Q24H Bandar Shea MD   500 mg at 11/10/21 0859       Home Meds:  Prior to Admission  medications    Medication Sig Start Date End Date Taking? Authorizing Provider   acetaminophen (TYLENOL) 325 MG tablet Take 650 mg by mouth Every 6 (Six) Hours As Needed for Mild Pain .   Yes Leila Daly MD   apixaban (ELIQUIS) 5 MG tablet tablet Take 5 mg by mouth 2 (Two) Times a Day.   Yes Leila Daly MD   aspirin 81 MG EC tablet Take 81 mg by mouth Daily.   Yes Leila Daly MD   carvedilol (COREG) 25 MG tablet Take 1 tablet by mouth 2 (Two) Times a Day With Meals. 7/1/21  Yes Davon Urrutia MD   ceFAZolin in 0.9% normal saline (ANCEF) 2 GM/ 100 mL solution IVPB Infuse 100 mL into a venous catheter Every 8 (Eight) Hours for 19 days. Indications: Bacteria in the Blood 10/23/21 11/11/21 Yes Nayan Hale DO   Diclofenac Sodium (VOLTAREN) 1 % gel gel Apply 4 g topically to the appropriate area as directed 4 (Four) Times a Day As Needed.   Yes Leila Daly MD   docusate sodium 100 MG capsule Take 1 capsule by mouth 2 (Two) Times a Day. 7/13/21  Yes Christos Min DO   famotidine (PEPCID) 40 MG tablet Take 40 mg by mouth Daily.   Yes Leila Daly MD   ferrous sulfate 325 (65 Fe) MG tablet Take 325 mg by mouth Daily With Breakfast.   Yes Leila Daly MD   fluticasone (FLONASE) 50 MCG/ACT nasal spray 1 spray into the nostril(s) as directed by provider Daily As Needed. 5/1/21  Yes Leila Daly MD   folic acid (FOLVITE) 1 MG tablet Take 1 mg by mouth Daily.   Yes Leila Daly MD   furosemide (LASIX) 40 MG tablet Take 40 mg by mouth Daily.   Yes Leila Daly MD   HYDROcodone-acetaminophen (NORCO) 7.5-325 MG per tablet Take 1 tablet by mouth Every 4 (Four) Hours As Needed for Moderate Pain . 10/28/21  Yes Nayan Hale DO   isosorbide mononitrate (IMDUR) 60 MG 24 hr tablet Take 1 tablet by mouth 2 (Two) Times a Day. 3/26/21  Yes Taiwo Prado APRN   levothyroxine (SYNTHROID, LEVOTHROID) 75 MCG tablet Take 1 tablet by mouth  Daily. 6/22/21  Yes Davon Urrutia MD   lidocaine (LIDODERM) 5 % Place 1 patch on the skin as directed by provider Daily As Needed. Remove & Discard patch within 12 hours or as directed by MD    Yes Leila Daly MD   magnesium oxide (MAG-OX) 400 MG tablet Take 400 mg by mouth Daily. 4/12/21  Yes Leila Daly MD   multivitamin with minerals tablet tablet Take 1 tablet by mouth Daily. 3/6/21  Yes Taiwo Prado APRN   Naloxegol Oxalate (Movantik) 25 MG tablet Take 25 mg by mouth Every Morning.   Yes Leila Daly MD   nitroglycerin (Nitrostat) 0.4 MG SL tablet Place 1 tablet under the tongue Every 5 (Five) Minutes As Needed for Chest Pain. Take no more than 3 doses in 15 minutes. 3/26/21  Yes Taiwo Prado APRN   pantoprazole (PROTONIX) 40 MG EC tablet Take 1 tablet by mouth Daily. 6/22/21  Yes Davon Urrutia MD   polyethylene glycol (polyethylene glycol) 17 g packet Take 17 g by mouth Daily. 10/29/21  Yes Nayan Hale DO   potassium chloride (KLOR-CON) 20 MEQ packet Take 20 mEq by mouth Daily.   Yes Leila Daly MD   saccharomyces boulardii (Florastor) 250 MG capsule Take 1 capsule by mouth Daily. 7/1/21  Yes Davon Urrutia MD   salsalate (DISALCID) 500 MG tablet Take 500 mg by mouth Daily. Take 5 tablets by mouth Monday, Wednesdays and Fridays, and 4 tablets on Tuesdays, Thursday and Sundays   Yes Leila Daly MD   sucralfate (CARAFATE) 1 g tablet Take 1 g by mouth 4 (Four) Times a Day Before Meals & at Bedtime.   Yes Leila Daly MD   thiamine1 (VITAMIN B1) 250 MG tablet Take 250 mg by mouth Daily.   Yes Leila Daly MD   traMADol (ULTRAM) 50 MG tablet Take 50 mg by mouth 2 (Two) Times a Day.   Yes Leila Daly MD   valACYclovir (VALTREX) 500 MG tablet Take 1 tablet by mouth Daily. Indications: chronic suppressive therapy 6/22/21  Yes Davon Urrutia MD            Allergies   Allergen Reactions   • Atorvastatin Other (See  "Comments)     LEG CRAMPS     • Amoxicillin Rash   • Escitalopram Rash   • Nabumetone Rash   • Niacin Er Rash   • Penicillins Rash   • Simvastatin Rash       Review of Systems   Constitutional: Negative for fever.   HENT: Negative for congestion.    Eyes: Negative for photophobia.   Respiratory: Negative for cough.    Cardiovascular: Negative for chest pain.   Gastrointestinal: Negative for vomiting.   Musculoskeletal: Positive for back pain.   Skin: Positive for wound.   Allergic/Immunologic: Positive for immunocompromised state.   Neurological: Positive for weakness.         Vital Signs:  /45 (BP Location: Left arm, Patient Position: Lying)   Pulse 69   Temp 98.1 °F (36.7 °C) (Oral)   Resp 18   Ht 167.6 cm (66\")   Wt 71.6 kg (157 lb 14.4 oz)   LMP  (LMP Unknown)   SpO2 95%   BMI 25.49 kg/m²   Temp (24hrs), Av.2 °F (36.8 °C), Min:98 °F (36.7 °C), Max:98.6 °F (37 °C)      Physical Exam   General: The patient is nontoxic-appearing lying in bed on her left side in no acute distress  HEENT: Sclera anicteric.  Eyeglasses in place  Respiratory: Effort even and unlabored.  She is not conversationally dyspneic  Abdomen: Soft, nontender, nondistended  Extremities: Right hip dressing is in place.  This was peeled back and packing was in place along incision line.  No surrounding cellulitis or active drainage.  No foul odor  Neuro: Alert, oriented, speech clear.  Symmetric and full strength upper extremities.  Patient was able to wiggle toes for either lower extremities.  See PT note for further info per Nicole, PT  Psych: Pleasant cooperative        Line/IV site: Midlineupper arm right, condition patent and no redness    Results Review:    I reviewed the patient's new clinical results.    Lab Results:  CBC:   Lab Results   Lab 21  0711 21  0902   WBC 4.85 5.39   HEMOGLOBIN 7.3* 7.2*   HEMATOCRIT 23.7* 23.6*   PLATELETS 247 258       CMP:   Lab Results   Lab 21  0711 21  09   SODIUM " 135* 135*   POTASSIUM 4.8 4.6   CHLORIDE 100 100   CO2 29.0 27.0   BUN 27* 25*   CREATININE 0.71 0.87   CALCIUM 9.1 8.8   BILIRUBIN 0.5 0.5   ALK PHOS 151* 154*   ALT (SGPT) <5 <5   AST (SGOT) 23 21   GLUCOSE 100* 116*       Lab Results (last 72 hours)     ** No results found for the last 72 hours. **          Estimated Creatinine Clearance: 62.7 mL/min (by C-G formula based on SCr of 0.87 mg/dL).    Culture Results:    Microbiology Results (last 10 days)     ** No results found for the last 240 hours. **         10/24/2021 0629 10/29/2021 0645 Blood Culture With BREONNA - Blood, Arm, Left [[id number]]   Blood from Arm, Left    Final result Component Value   Blood Culture No growth at 5 days              10/23/2021 1316 10/26/2021 0650 Body Fluid Culture - Body Fluid, Thigh, Right [[id number]]    (Abnormal)   Body Fluid from Thigh, Right    Final result Component Value   Body Fluid Culture Rare Staphylococcus aureus Abnormal    Gram Stain Many (4+) WBCs per low power field    Rare (1+) Gram positive cocci         Susceptibility     Staphylococcus aureus     LICHA     Gentamicin <=0.5  Susceptible     Oxacillin 0.5  Susceptible     Rifampin <=0.5  Susceptible     Vancomycin <=0.5  Susceptible               Linear View          10/23/2021 1101 10/28/2021 1200 Blood Culture With BREONNA - Blood, Hand, Left [[id number]]   Blood from Hand, Left    Final result Component Value   Blood Culture No growth at 5 days              10/21/2021 1234 10/24/2021 0634 Blood Culture - Blood, Arm, Right [[id number]]    (Abnormal)   Blood from Arm, Right    Final result Component Value   Blood Culture Staphylococcus aureus Critical     Infectious disease consultation is highly recommended to rule out distant foci of infection.   Isolated from Aerobic and Anaerobic Bottles   Gram Stain Aerobic Bottle Gram positive cocci in clusters Critical     Anaerobic Bottle Gram positive cocci in clusters Critical          Susceptibility     Staphylococcus aureus      LICHA     Gentamicin <=0.5  Susceptible     Oxacillin 0.5  Susceptible     Rifampin <=0.5  Susceptible     Vancomycin <=0.5  Susceptible               Linear View          10/21/2021 1234 10/22/2021 0420 Blood Culture ID, PCR - Blood, Arm, Right [[id number]]    (Abnormal)   Blood from Arm, Right    Final result Component Value   BCID, PCR Staph aureus. mecA/C and MREJ (methicillin resistance gene) NOT detected. Identification by BCID2 PCR Abnormal               10/21/2021 1233 10/24/2021 0635 Blood Culture - Blood, Arm, Right [[id number]]    (Abnormal)   Blood from Arm, Right    Final result Component Value   Blood Culture Staphylococcus aureus Critical     Infectious disease consultation is highly recommended to rule out distant foci of infection.   Isolated from Aerobic and Anaerobic Bottles   Gram Stain Aerobic Bottle Gram positive cocci in clusters Critical     Anaerobic Bottle Gram positive cocci in clusters Critical               10/21/2021 1009 10/24/2021 1030 Urine Culture - Urine, Urine, Catheter In/Out [[id number]]    (Abnormal)   Urine, Catheter In/Out    Final result Component Value   Urine Culture >100,000 CFU/mL Escherichia coli Abnormal          Susceptibility     Escherichia coli     LICHA     Amikacin <=2  Susceptible     Ampicillin >=32  Resistant     Ampicillin + Sulbactam 8  Susceptible     Cefazolin <=4  Susceptible     Cefepime <=1  Susceptible     Ceftazidime <=1  Susceptible     Ceftriaxone <=1  Susceptible     Gentamicin >=16  Resistant     Levofloxacin >=8  Resistant     Nitrofurantoin <=16  Susceptible     Piperacillin + Tazobactam <=4  Susceptible     Tetracycline >=16  Resistant     Tobramycin 8  Intermediate     Trimethoprim + Sulfamethoxazole >=320  Resistant               Linear View                Radiology:   Imaging Results (Last 72 Hours)     ** No results found for the last 72 hours. **        CT Lumbar Spine Without Contrast [[id number]] Rick as Reviewed   Order Status: Completed  Collected: 11/04/21 0912    Updated: 11/04/21 0941   Narrative:     EXAMINATION: CT LUMBAR SPINE WO CONTRAST-        11/4/2021 8:34 AM CDT       HISTORY: Chronic back pain. Previous surgery and fractures.       In order to have a CT radiation dose as low as reasonably achievable   Automated Exposure Control was utilized for adjustment of the mA and/or   KV according to patient size.       DLP in mGycm= 478.       Axial, sagittal, and coronal noncontrast lumbar spine CT.       Severe diffuse demineralization.   Intact sacrum and SI joints.   No scoliosis.       L3-4 and L4-5 discectomy.   Bilateral pedicle screw hardware fusion at L3, L4, and L5.   Hardware is intact.       High-grade disc space narrowing with endplate spurring and degenerative   endplate sclerosis at L1-2.       Chronic T12 compression fracture with kyphoplasty treatment.   Approximately 40% loss of height.       Lordotic curvature is appropriate.   No significant malalignment.       Right laminectomy defect at L2.   Posterior element resection at L4.       Intact sacrum.   Symmetric SI joints.       Aortic calcification with no aneurysm.       No acute lumbar spine fracture is seen.       Evidence of disc bulge with spinal canal stenosis at L1-2.   Decompression based on right laminectomy defect.       Disc protrusion, facet disease, and endplate spurring with mild   foraminal narrowing at L2-3.       L3-4 discectomy level with streak artifact.   Appearance of moderate right foraminal stenosis based on spurring.       L4-5 discectomy level with streak artifact.   No significant stenosis is seen.       L5-S1 shows facet hypertrophy. Mild relative foraminal narrowing on the   right.       Summary:   1. Extensive postsurgical and degenerative change.   2. Severe demineralization.   3. No acute fracture is seen.              MRI report from [facility] November 3, 2021  Impression  1.  Fluid collection in the spinal canal from T11 vertebral  body down to L1-L2 disc facing causing significant cord compression this is most severe at the L1 level.  This was felt to be consistent with hematoma in the anterior epidural space.  2.  Prior kyphoplasty at T12  3.  Central disc herniation T1-T2.  Severe narrowing of L1-L2 disc  4.  Postoperative and degenerative changes in the remainder of the lumbar spine    Assessment/Plan       Epidural hematoma (HCC)    Severe malnutrition (HCC)      IMPRESSION:  1. Methicillin susceptible Staphylococcus aureus bacteremia-recurrent after prolonged bacteremia several months prior.  2. T12 compression fracture status post kyphoplasty  3. Epidural hematoma  4. Candida intertrigo of the intergluteal cleft with pressure injury right and left buttocks.  5. Fluid collection right greater trochanter status post I&D of seroma per Dr. Turner previous admission.  Cultures positive for methicillin susceptible Staphylococcus aureus felt to be consistent with infected seroma seeded from bacteremia as opposed to primary abscess      RECOMMENDATION:   · We will plan a total of 4 weeks therapy with cefazolin 2 g IV every 12 given the fact she has had recurrent methicillin susceptible Staphylococcus aureus bacteremia.  Today is day #18 of 28  · Dressing changes per Dr. Turner  · Continue physical therapy  · Await plans for transfer to rehab facility.        Maggie Bang MD  11/10/21  14:41 CST

## 2022-01-14 NOTE — ED PROVIDER NOTE - CPE EDP ENMT NORM
[facility] - Cardiology Stepdown  Interventional Cardiology  Consult Note    Patient Name: Debbie Metcalf  MRN: [id number]  Admission Date: 1/13/2022  Hospital Length of Stay: 1 days  Code Status: Full Code   Attending Provider: Reno Souza*  Consulting Provider: Slava Chowdary MD  Primary Care Physician: MEÑO Abdalla  Principal Problem:NSTEMI (non-ST elevated myocardial infarction)    Patient information was obtained from patient, past medical records and ER records.     Inpatient consult to Interventional Cardiology  Consult performed by: Slava Chowdary MD  Consult ordered by: Anat Singh PA-C  Reason for consult: NSTEMI  Assessment/Recommendations: Please see A/P        Subjective:     Reason for consult: NSTEMI     HPI:  Patient is a 75 year old female with history of T2DM (01/14/2022 Hgb a1c 9%), diabetic neuropathy, HTN, HLD (LDL 72 mg /dL on atorvastatin 20 mg PO at home), chronic back pain 2/2 scoliosis who presented to the ED on 01/13/2022 with left arm pain radiating to substernal chest discomfort; reports being woken up from afternoon nap ~ 2:30 bpm with acute onset pain starting in her left arm and radiating to her left shoulder and left chest. She rated the pain as 8/10, substernal/borderline left breast wall, sudden onset after lunch with a friend. Associated with tingling in her left arm. Blood glucose was 360 mg/dL (baseline 180-200 mg/dL) and she took 25U insulin. Her blood glucose came down but her pain remained and she called EMS to bring to the ER. Enroute, she was given SL NTG and aspirin, which helped alleviate the pain.       Patient currently denies any chest discomfort, jaw pain, arm pain, shortness of breath. No prior history of chest pain. Does not exercise regularly but reports dyspnea on exertion for the last 2 months with walking up flights of stairs or running after grandchildren. Started developing fluid in legs bilaterally 2 years ago for which she was  started on aldactone. No known prior TTE.     In the ED, /90, HR 88 bpm, sating 99% on RA. CBC wnl, CMP wnl. CXR wnl. ECG notable for sinus rhythm, 1st AVB, Q-waves septal and lateral leads. Cr 1.1. Hgb 13.4 g/dL Plt 269 k cells/uL. No prior ECGs available for comparison. On bedside Echo by overnight fellow, LVEF appeared to be 55-60% with no WMA. RV wnl. Troponin trend 2.9 --> 16.25 --> 23.8. CT head without contrast obtained by primary over concerns regarding dysarthric speech, remote lacunar infarcts. Patient was loaded with  mg PO x 1 by EMS and Plavix 300 mg PO x 1 in ED.      No reported family history of premature CAD.   Denies tobacco use, alcohol or other drug use. Worked as a  prior to [employer]. Lives in [place]. Has 3 children. Lives alone. Ambulates with cane due to recent thoracic vertebral injury.       Past Medical History:   Diagnosis Date    Diabetes mellitus        Past Surgical History:   Procedure Laterality Date    EYE SURGERY      gender reaffirmation         Review of patient's allergies indicates:  No Known Allergies    PTA Medications   Medication Sig    atorvastatin (LIPITOR) 20 MG tablet Take 20 mg by mouth once daily.    estradioL (ESTRACE) 2 MG tablet Take 2 mg by mouth 2 (two) times a day.    insulin aspart U-100 (NOVOLOG) 100 unit/mL injection Inject into the skin 3 (three) times daily before meals.    insulin detemir U-100 (LEVEMIR) 100 unit/mL injection Inject 100 Units into the skin every evening.    LANCETS MISC by Misc.(Non-Drug; Combo Route) route.    minoxidil (ROGAINE TOP) Apply topically.    sodium chloride 5% (SAKINA 128) 5 % ophthalmic solution Place 1 drop into both eyes 3 (three) times daily.    spironolactone (ALDACTONE) 25 MG tablet Take 25 mg by mouth once daily.     Family History     Problem Relation (Age of Onset)    Diabetes Father    Lung cancer Mother    Stroke Father        Tobacco Use    Smoking status: Never Smoker     Smokeless tobacco: Never Used   Substance and Sexual Activity    Alcohol use: Never    Drug use: Never    Sexual activity: Not on file     Review of Systems   Constitutional: Negative for chills and fever.   Cardiovascular: Negative for chest pain, orthopnea and palpitations.   Respiratory: Negative for cough and shortness of breath.    Gastrointestinal: Negative for abdominal pain.   Neurological: Negative for dizziness, headaches and light-headedness.   Psychiatric/Behavioral: Negative for altered mental status.     Objective:     Vital Signs (Most Recent):  Temp: 98 °F (36.7 °C) (01/14/22 0412)  Pulse: 76 (01/14/22 0412)  Resp: 16 (01/14/22 0412)  BP: (!) 154/81 (01/14/22 0412)  SpO2: 97 % (01/14/22 0412) Vital Signs (24h Range):  Temp:  [97.5 °F (36.4 °C)-98 °F (36.7 °C)] 98 °F (36.7 °C)  Pulse:  [76-94] 76  Resp:  [16-22] 16  SpO2:  [95 %-100 %] 97 %  BP: (134-182)/(75-90) 154/81     Weight: 103 kg (227 lb)  Body mass index is 29.95 kg/m².    SpO2: 97 %  O2 Device (Oxygen Therapy): room air    No intake or output data in the 24 hours ending 01/14/22 0549    Lines/Drains/Airways     Peripheral Intravenous Line                 Peripheral IV - Single Lumen 01/13/22 1910 20 G Right Antecubital <1 day                Physical Exam  Vitals reviewed.   Constitutional:       General: She is not in acute distress.     Appearance: Normal appearance. She is not toxic-appearing.      Comments: overweight   HENT:      Head: Normocephalic and atraumatic.      Mouth/Throat:      Mouth: Mucous membranes are moist.   Cardiovascular:      Rate and Rhythm: Normal rate and regular rhythm.      Heart sounds: No murmur heard.  No friction rub. No gallop.       Comments: Radial pulses 2+ bilaterally  DP pulses 2+ bilaterally  Pulmonary:      Effort: Pulmonary effort is normal. No respiratory distress.      Breath sounds: Normal breath sounds.   Abdominal:      Palpations: Abdomen is soft.   Musculoskeletal:      Right lower leg: No  edema.      Left lower leg: No edema.   Skin:     General: Skin is warm.   Neurological:      Mental Status: She is alert and oriented to person, place, and time. Mental status is at baseline.       Significant Labs:   BMP:   Recent Labs   Lab 01/13/22 1912   *   *   K 4.1      CO2 23   BUN 11   CREATININE 1.1   CALCIUM 9.3   , CMP   Recent Labs   Lab 01/13/22 1912   *   K 4.1      CO2 23   *   BUN 11   CREATININE 1.1   CALCIUM 9.3   PROT 7.2   ALBUMIN 3.6   BILITOT 0.6   ALKPHOS 99   AST 23   ALT 10   ANIONGAP 11   ESTGFRAFRICA 57.2*   EGFRNONAA 49.6*   , CBC   Recent Labs   Lab 01/13/22 1912 01/13/22 1912 01/13/22 2150 01/13/22 2150 01/14/22 0420   WBC 11.53  --  11.95  --  12.08   HGB 14.0  --  13.1  --  13.4   HCT 42.5   < > 41.2   < > 42.2     --  252  --  269    < > = values in this interval not displayed.   , INR   Recent Labs   Lab 01/13/22 2150   INR 0.9   , Troponin   Recent Labs   Lab 01/13/22 1912 01/13/22 2050 01/14/22 0420   TROPONINI 2.900* 16.249* 23.859*  23.691*    and All pertinent lab results from the last 24 hours have been reviewed.    Significant Imaging: Echocardiogram: Transthoracic echo (TTE) complete (Cupid Only): No results found for this or any previous visit. and X-Ray: CXR: X-Ray Chest 1 View (CXR): No results found for this visit on 01/13/22. and X-Ray Chest PA and Lateral (CXR): No results found for this visit on 01/13/22.     ECG 01/13/2022 and 01/14/2022: Sinus rhythm with 1st degree AV block, negative concordance in inferior leads, left axis deviation; negative concordance throughout precordial leads    Assessment and Plan:     * NSTEMI (non-ST elevated myocardial infarction)  Patient is a 75 year old female with history of T2DM (01/14/2022 Hgb a1c 9%), diabetic neuropathy, HTN, HLD (LDL 72 mg /dL on atorvastatin 20 mg PO at home), chronic back pain 2/2 scoliosis who presented to the ED on 01/13/2022 with left arm pain  radiating to substernal chest discomfort. Admitted for NSTEMI to Hospital Medicine. In the ED, /90, HR 88 bpm, sating 99% on RA. CBC wnl, CMP wnl. CXR wnl. ECG notable for sinus rhythm, 1st AVB, Q-waves septal and lateral leads. Cr 1.1. Hgb 13.4 g/dL Plt 269 k cells/uL. No prior ECGs available for comparison. On bedside Echo by overnight fellow, LVEF appeared to be 55-60% with no WMA. RV wnl. Troponin trend 2.9 --> 16.25 --> 23.8. CT head without contrast obtained by primary over concerns regarding dysarthric speech, remote lacunar infarcts. Patient was loaded with  mg PO x 1 by EMS and Plavix 300 mg PO x 1 in ED. Interventional Cardiology consulted for recommendation on coronary angiography.       - Follow Formal TTE       - NPO since midnight       - Type and screen completed       - COVID-19 negative 01/13/2022       - Risk factor reduction (optimize BP, T2DM control)  --[facility] +/- PCI, patient is a [facility] candidate  - Anti-platelet Therapy: ASA / Plavix  - Access: Right radial  - Catheters: Gurdeep  - Creatinine/CrCl: 1.1  - Allergies: No shellfish / Iodine allergy  - Pre-Hydration: NS  - Pre-Op Med: Bendaryl 50mg PO x1  - All patient's questions were answered.  -The risks, benefits and alternatives of the procedure were explained to the patient.   -The risks of coronary angiography include but are not limited to: bleeding, infection, heart rhythm abnormalities, allergic reactions, kidney injury and potential need for dialysis, stroke and death.   - Should stenting be indicated, the patient has agreed to dual anti-platelet therapy for 1-consecutive year with a drug-eluting stent and a minimum of 1-month with the use of a bare metal stent  - Additionally, pt is aware that non-compliance is likely to result in stent clotting with heart attack, heart failure, and/or death  -The risks of moderate sedation include hypotension, respiratory depression, arrhythmias, bronchospasm, and death.   - Informed consent was  obtained and the patient is agreeable to proceed with the procedure.         Dysarthria  CT head negative 01/14/2022  Management per primary    Hyperlipidemia associated with type 2 diabetes mellitus  LDL 77 mg/dL on 01/14/2022  - Continue high dose statin for NSTEMI    Hypertension  Management per primary    Type 2 diabetes mellitus, with long-term current use of insulin  Recommend optimizing management to reduce cardiovascular complication risks  Management per primary        VTE Risk Mitigation (From admission, onward)         Ordered     heparin 25,000 units in dextrose 5% (100 units/ml) IV bolus from bag - ADDITIONAL PRN BOLUS - 60 units/kg (max bolus 4000 units)  As needed (PRN)        Question:  Heparin Infusion Adjustment (DO NOT MODIFY ANSWER)  Answer:  [email or web address]\epic\Images\Pharmacy\HeparinInfusions\heparin LOW INTENSITY nomogram for [facility] [id number]    01/13/22 2139     heparin 25,000 units in dextrose 5% (100 units/ml) IV bolus from bag - ADDITIONAL PRN BOLUS - 30 units/kg (max bolus 4000 units)  As needed (PRN)        Question:  Heparin Infusion Adjustment (DO NOT MODIFY ANSWER)  Answer:  [email or web address]\epic\Images\Pharmacy\HeparinInfusions\heparin LOW INTENSITY nomogram for [facility] [id number]    01/13/22 2139     Reason for No Pharmacological VTE Prophylaxis  Once        Question:  Reasons:  Answer:  IV Heparin w/in 24 hrs. Pre or Post-Op    01/13/22 2311     IP VTE HIGH RISK PATIENT  Once         01/13/22 2311     Place sequential compression device  Until discontinued         01/13/22 2259     heparin 25,000 units in dextrose 5% 250 mL (100 units/mL) infusion LOW INTENSITY nomogram - [facility]  Continuous        Question Answer Comment   Heparin Infusion Adjustment (DO NOT MODIFY ANSWER) [email or web address]\epic\Images\Pharmacy\HeparinInfusions\heparin LOW INTENSITY nomogram for [facility] [id number]    Begin at (in units/kg/hr) 12        01/13/22 2139              Thank you for your consult. We will sign off. Please  contact us if you have any additional questions.    Plan of care was discussed with staff, Dr. Berry.    Slava Chowdary MD  Interventional Cardiology PGY4  [facility] - Cardiology Stepdown       normal...

## 2022-05-19 ENCOUNTER — INPATIENT (INPATIENT)
Facility: HOSPITAL | Age: 77
LOS: 3 days | Discharge: ANOTHER HOSPITAL | DRG: 305 | End: 2022-05-23
Attending: INTERNAL MEDICINE | Admitting: INTERNAL MEDICINE
Payer: MEDICARE

## 2022-05-19 VITALS
DIASTOLIC BLOOD PRESSURE: 76 MMHG | SYSTOLIC BLOOD PRESSURE: 137 MMHG | HEIGHT: 62 IN | WEIGHT: 118.83 LBS | HEART RATE: 106 BPM | OXYGEN SATURATION: 97 % | TEMPERATURE: 99 F | RESPIRATION RATE: 18 BRPM

## 2022-05-19 DIAGNOSIS — I10 ESSENTIAL (PRIMARY) HYPERTENSION: ICD-10-CM

## 2022-05-19 DIAGNOSIS — F41.9 ANXIETY DISORDER, UNSPECIFIED: ICD-10-CM

## 2022-05-19 DIAGNOSIS — M54.9 DORSALGIA, UNSPECIFIED: ICD-10-CM

## 2022-05-19 DIAGNOSIS — Z90.710 ACQUIRED ABSENCE OF BOTH CERVIX AND UTERUS: Chronic | ICD-10-CM

## 2022-05-19 DIAGNOSIS — R07.9 CHEST PAIN, UNSPECIFIED: ICD-10-CM

## 2022-05-19 DIAGNOSIS — Z29.9 ENCOUNTER FOR PROPHYLACTIC MEASURES, UNSPECIFIED: ICD-10-CM

## 2022-05-19 LAB
ALBUMIN SERPL ELPH-MCNC: 3.4 G/DL — LOW (ref 3.5–5)
ALP SERPL-CCNC: 115 U/L — SIGNIFICANT CHANGE UP (ref 40–120)
ALT FLD-CCNC: 18 U/L DA — SIGNIFICANT CHANGE UP (ref 10–60)
ANION GAP SERPL CALC-SCNC: 4 MMOL/L — LOW (ref 5–17)
APTT BLD: 30.6 SEC — SIGNIFICANT CHANGE UP (ref 27.5–35.5)
AST SERPL-CCNC: 22 U/L — SIGNIFICANT CHANGE UP (ref 10–40)
BASOPHILS # BLD AUTO: 0.01 K/UL — SIGNIFICANT CHANGE UP (ref 0–0.2)
BASOPHILS NFR BLD AUTO: 0.1 % — SIGNIFICANT CHANGE UP (ref 0–2)
BILIRUB SERPL-MCNC: 0.2 MG/DL — SIGNIFICANT CHANGE UP (ref 0.2–1.2)
BUN SERPL-MCNC: 14 MG/DL — SIGNIFICANT CHANGE UP (ref 7–18)
CALCIUM SERPL-MCNC: 9.9 MG/DL — SIGNIFICANT CHANGE UP (ref 8.4–10.5)
CHLORIDE SERPL-SCNC: 106 MMOL/L — SIGNIFICANT CHANGE UP (ref 96–108)
CK MB BLD-MCNC: 6.2 % — HIGH (ref 0–3.5)
CK MB CFR SERPL CALC: 5.6 NG/ML — HIGH (ref 0–3.6)
CK SERPL-CCNC: 90 U/L — SIGNIFICANT CHANGE UP (ref 21–215)
CO2 SERPL-SCNC: 28 MMOL/L — SIGNIFICANT CHANGE UP (ref 22–31)
CREAT SERPL-MCNC: 0.54 MG/DL — SIGNIFICANT CHANGE UP (ref 0.5–1.3)
D DIMER BLD IA.RAPID-MCNC: 362 NG/ML DDU — HIGH
EGFR: 96 ML/MIN/1.73M2 — SIGNIFICANT CHANGE UP
EOSINOPHIL # BLD AUTO: 0.11 K/UL — SIGNIFICANT CHANGE UP (ref 0–0.5)
EOSINOPHIL NFR BLD AUTO: 1.5 % — SIGNIFICANT CHANGE UP (ref 0–6)
GLUCOSE SERPL-MCNC: 111 MG/DL — HIGH (ref 70–99)
HCOV PNL SPEC NAA+PROBE: DETECTED
HCT VFR BLD CALC: 42.4 % — SIGNIFICANT CHANGE UP (ref 34.5–45)
HGB BLD-MCNC: 14 G/DL — SIGNIFICANT CHANGE UP (ref 11.5–15.5)
IMM GRANULOCYTES NFR BLD AUTO: 0.3 % — SIGNIFICANT CHANGE UP (ref 0–1.5)
INR BLD: 0.96 RATIO — SIGNIFICANT CHANGE UP (ref 0.88–1.16)
LIDOCAIN IGE QN: 168 U/L — SIGNIFICANT CHANGE UP (ref 73–393)
LYMPHOCYTES # BLD AUTO: 2.26 K/UL — SIGNIFICANT CHANGE UP (ref 1–3.3)
LYMPHOCYTES # BLD AUTO: 31.8 % — SIGNIFICANT CHANGE UP (ref 13–44)
MCHC RBC-ENTMCNC: 31.9 PG — SIGNIFICANT CHANGE UP (ref 27–34)
MCHC RBC-ENTMCNC: 33 GM/DL — SIGNIFICANT CHANGE UP (ref 32–36)
MCV RBC AUTO: 96.6 FL — SIGNIFICANT CHANGE UP (ref 80–100)
MONOCYTES # BLD AUTO: 0.77 K/UL — SIGNIFICANT CHANGE UP (ref 0–0.9)
MONOCYTES NFR BLD AUTO: 10.8 % — SIGNIFICANT CHANGE UP (ref 2–14)
NEUTROPHILS # BLD AUTO: 3.94 K/UL — SIGNIFICANT CHANGE UP (ref 1.8–7.4)
NEUTROPHILS NFR BLD AUTO: 55.5 % — SIGNIFICANT CHANGE UP (ref 43–77)
NRBC # BLD: 0 /100 WBCS — SIGNIFICANT CHANGE UP (ref 0–0)
PLATELET # BLD AUTO: 275 K/UL — SIGNIFICANT CHANGE UP (ref 150–400)
POTASSIUM SERPL-MCNC: 3.9 MMOL/L — SIGNIFICANT CHANGE UP (ref 3.5–5.3)
POTASSIUM SERPL-SCNC: 3.9 MMOL/L — SIGNIFICANT CHANGE UP (ref 3.5–5.3)
PROT SERPL-MCNC: 7.8 G/DL — SIGNIFICANT CHANGE UP (ref 6–8.3)
PROTHROM AB SERPL-ACNC: 11.4 SEC — SIGNIFICANT CHANGE UP (ref 10.5–13.4)
RAPID RVP RESULT: DETECTED
RBC # BLD: 4.39 M/UL — SIGNIFICANT CHANGE UP (ref 3.8–5.2)
RBC # FLD: 12.4 % — SIGNIFICANT CHANGE UP (ref 10.3–14.5)
SARS-COV-2 RNA SPEC QL NAA+PROBE: SIGNIFICANT CHANGE UP
SODIUM SERPL-SCNC: 138 MMOL/L — SIGNIFICANT CHANGE UP (ref 135–145)
TROPONIN I, HIGH SENSITIVITY RESULT: 163.1 NG/L — HIGH
TROPONIN I, HIGH SENSITIVITY RESULT: 726 NG/L — HIGH
WBC # BLD: 7.11 K/UL — SIGNIFICANT CHANGE UP (ref 3.8–10.5)
WBC # FLD AUTO: 7.11 K/UL — SIGNIFICANT CHANGE UP (ref 3.8–10.5)

## 2022-05-19 PROCEDURE — 93010 ELECTROCARDIOGRAM REPORT: CPT

## 2022-05-19 PROCEDURE — 99285 EMERGENCY DEPT VISIT HI MDM: CPT | Mod: CS

## 2022-05-19 PROCEDURE — 74176 CT ABD & PELVIS W/O CONTRAST: CPT | Mod: 26,MA

## 2022-05-19 PROCEDURE — 71045 X-RAY EXAM CHEST 1 VIEW: CPT | Mod: 26

## 2022-05-19 RX ORDER — SENNA PLUS 8.6 MG/1
2 TABLET ORAL AT BEDTIME
Refills: 0 | Status: DISCONTINUED | OUTPATIENT
Start: 2022-05-19 | End: 2022-05-23

## 2022-05-19 RX ORDER — ACETAMINOPHEN 500 MG
650 TABLET ORAL EVERY 6 HOURS
Refills: 0 | Status: DISCONTINUED | OUTPATIENT
Start: 2022-05-19 | End: 2022-05-23

## 2022-05-19 RX ORDER — POLYETHYLENE GLYCOL 3350 17 G/17G
17 POWDER, FOR SOLUTION ORAL ONCE
Refills: 0 | Status: COMPLETED | OUTPATIENT
Start: 2022-05-19 | End: 2022-05-19

## 2022-05-19 RX ORDER — SODIUM CHLORIDE 9 MG/ML
1000 INJECTION INTRAMUSCULAR; INTRAVENOUS; SUBCUTANEOUS ONCE
Refills: 0 | Status: COMPLETED | OUTPATIENT
Start: 2022-05-19 | End: 2022-05-19

## 2022-05-19 RX ORDER — KETOROLAC TROMETHAMINE 30 MG/ML
30 SYRINGE (ML) INJECTION ONCE
Refills: 0 | Status: DISCONTINUED | OUTPATIENT
Start: 2022-05-19 | End: 2022-05-19

## 2022-05-19 RX ADMIN — SODIUM CHLORIDE 1000 MILLILITER(S): 9 INJECTION INTRAMUSCULAR; INTRAVENOUS; SUBCUTANEOUS at 17:22

## 2022-05-19 RX ADMIN — Medication 30 MILLIGRAM(S): at 17:22

## 2022-05-19 RX ADMIN — Medication 30 MILLILITER(S): at 18:49

## 2022-05-19 RX ADMIN — Medication 0.5 MILLIGRAM(S): at 17:30

## 2022-05-19 NOTE — H&P ADULT - PROBLEM SELECTOR PLAN 4
IMPROVE VTE Individual Risk Assessment  RISK                                                                Points  [  ] Previous VTE                                                  3  [  ] Thrombophilia                                               2  [  ] Lower limb paralysis                                      2        (unable to hold up >15 seconds)    [  ] Current Cancer                                              2         (within 6 months)  [x  ] Immobilization > 24 hrs                                1  [  ] ICU/CCU stay > 24 hours                              1  [x  ] Age > 60                                                      1  IMPROVE VTE Score _________2, -- for DVT proph  scd boots till CT angio results not on any medications at home  States she does not have a pharmacy not on any medications at home  States she does not have a pharmacy  Follow Utox No hx of HTN  /101  Will repeat BP and give labetalol If persistently elevated  Started on metoprolol for ACS

## 2022-05-19 NOTE — ED ADULT TRIAGE NOTE - CHIEF COMPLAINT QUOTE
left side abdominal pain "its the spleen" x1week, no nausea/vomiting with intermittent pain to bilateral under arms, no difficulty breathing.

## 2022-05-19 NOTE — H&P ADULT - PROBLEM SELECTOR PLAN 3
No hx of HTN  /101  Will repeat BP and give labetalol If persistently elevated Complaining of mild abdominal pain   Mild distension of bladder on imaging  Refusing bladder scan and coley  UA+   Started on ceftriaxone

## 2022-05-19 NOTE — H&P ADULT - PROBLEM SELECTOR PLAN 2
Hx of scoliosis   Presents with back pain   Follow C, T, L spine CT to r/o fracture vs disk herniation   CXR: Chronic thoracolumbar scoliosis along with midthoracic compression fractures of indeterminate age  Tylenol for pain   PT consult   Ortho consult in the am Hx of scoliosis   Presents with back pain   Follow C, T, L spine CT to r/o fracture vs disk herniation   CXR: Chronic thoracolumbar scoliosis along with midthoracic compression fractures of indeterminate age  Tylenol for pain   PT consult   Fall precautions   Ortho consult in the am

## 2022-05-19 NOTE — H&P ADULT - PROBLEM SELECTOR PLAN 5
IMPROVE VTE Individual Risk Assessment  RISK                                                                Points  [  ] Previous VTE                                                  3  [  ] Thrombophilia                                               2  [  ] Lower limb paralysis                                      2        (unable to hold up >15 seconds)    [  ] Current Cancer                                              2         (within 6 months)  [x  ] Immobilization > 24 hrs                                1  [  ] ICU/CCU stay > 24 hours                              1  [x  ] Age > 60                                                      1  IMPROVE VTE Score _________2, -- for DVT proph  scd boots till CT angio results not on any medications at home  States she does not have a pharmacy  Follow Utox

## 2022-05-19 NOTE — ED ADULT NURSE NOTE - NSIMPLEMENTINTERV_GEN_ALL_ED
Implemented All Universal Safety Interventions:  [place] to call system. Call bell, personal items and telephone within reach. Instruct patient to call for assistance. Room bathroom lighting operational. Non-slip footwear when patient is off stretcher. Physically safe environment: no spills, clutter or unnecessary equipment. Stretcher in lowest position, wheels locked, appropriate side rails in place.

## 2022-05-19 NOTE — ED PROVIDER NOTE - OBJECTIVE STATEMENT
76 y.o w/ no sig pmh presenting with left left sided abd pain x 1 week, endorses also noting b/l axillary pain x 3 this week. denies n, v, fever, cough. endorses noting diarrhea. 76 y.o w/ no sig pmh presenting with left left sided abd pain x 1 week, endorses also noting b/l axillary pain x 3 this week that she endorses as chest pain. denies n, v, fever, cough. endorses noting diarrhea.

## 2022-05-19 NOTE — ED PROVIDER NOTE - CLINICAL SUMMARY MEDICAL DECISION MAKING FREE TEXT BOX
Patient presenting chest pain and luq abd pain. no peritoneal. ekg sinus. will obtain lab, ct abd, assess for acs, surgical abd, ed obs and reassess

## 2022-05-19 NOTE — ED ADULT NURSE NOTE - NSICDXPASTMEDICALHX_GEN_ALL_CORE_FT
PAST MEDICAL HISTORY:  Chronic prescription benzodiazepine use     Opiate use     Pyelonephritis

## 2022-05-19 NOTE — H&P ADULT - NSHPPHYSICALEXAM_GEN_ALL_CORE
LOS:     VITALS:   T(C): 36.3 (05-19-22 @ 16:14), Max: 37.1 (05-19-22 @ 14:47)  HR: 93 (05-19-22 @ 16:14) (93 - 106)  BP: 175/101 (05-19-22 @ 16:14) (137/76 - 175/101)  RR: 19 (05-19-22 @ 16:14) (18 - 19)  SpO2: 97% (05-19-22 @ 16:14) (97% - 97%)    GENERAL: NAD, lying in bed comfortably  HEAD:  Atraumatic, Normocephalic  EYES: EOMI, PERRLA, conjunctiva and sclera clear  ENT: Moist mucous membranes  NECK: Supple, No JVD  CHEST/LUNG: Clear to auscultation bilaterally; No rales, rhonchi, wheezing, or rubs. Unlabored respirations  HEART: Regular rate and rhythm; No murmurs, rubs, or gallops  ABDOMEN: BSx4; Soft, nontender, nondistended  EXTREMITIES:  2+ Peripheral Pulses, brisk capillary refill. No clubbing, cyanosis, or edema  NERVOUS SYSTEM:  A&Ox3, no focal deficits   SKIN: No rashes or lesions

## 2022-05-19 NOTE — ED ADULT NURSE NOTE - OBJECTIVE STATEMENT
Pt AOx4, ambulatory, c/o LLQ, BL arms and chest pain. Denies SOB, hematuria, dysuria. No distress noted.

## 2022-05-19 NOTE — H&P ADULT - PROBLEM SELECTOR PLAN 6
IMPROVE VTE Individual Risk Assessment  RISK                                                                Points  [  ] Previous VTE                                                  3  [  ] Thrombophilia                                               2  [  ] Lower limb paralysis                                      2        (unable to hold up >15 seconds)    [  ] Current Cancer                                              2         (within 6 months)  [x  ] Immobilization > 24 hrs                                1  [  ] ICU/CCU stay > 24 hours                              1  [x  ] Age > 60                                                      1  IMPROVE VTE Score _________2, -- for DVT proph  scd boots till CT angio results

## 2022-05-19 NOTE — H&P ADULT - ASSESSMENT
77y/o F with anxiety, depression presents to the ED with the complaint of chest pain for the past week. Admitted for ACS r/o

## 2022-05-19 NOTE — H&P ADULT - PROBLEM SELECTOR PLAN 1
ACS vs aortic dissection vs MSK   Presented with chest and back pain (not acute)  T1: 163, T2 726  EKG: NSR  Follow T3  Follow CT angio aorta to r/o dissection   Hold heparin drip till CT results   Follow Echo  Dr. Casper consulted ACS vs aortic dissection vs MSK   Presented with chest and back pain (not acute)  T1: 163, T2 726  EKG: NSR  Follow T3  Follow CT angio aorta to r/o dissection   Follow CKMB and dimer  Hold heparin drip till CT results   Follow Echo  Dr. Casper consulted ACS vs aortic dissection vs MSK   Presented with chest and back pain (not acute)  T1: 163, T2 726  EKG: NSR  Follow T3  prelim report or CT angio aorta negative for dissection   Follow CKMB and dimer  Started on heparin drip, aspirin, atorvastatin and metoprolol   Follow Echo  Dr. Casper consulted

## 2022-05-19 NOTE — H&P ADULT - HISTORY OF PRESENT ILLNESS
77y/o F with anxiety, depression presents to the ED with the complaint of chest pain for the past week. Patient states she was lifting a heavy box 2 weeks ago at home after which she started having back pain. For that her son got her a back brace that she put on very tight and has had it on day and night for 2 weeks. Last week she started developing bilateral intermittent chest pain, not related to exercise or exertion. Since the past couple of days she has also been having mild left lower quadrant abdominal pain. She uses 4 pillows at night because of her back but denies orthopnea, dyspnea, PND, lower extremity swelling, palpitations. Has intermittent soft bowel movements but denies constipation, fever, chills, vomiting, melena, hematochezia, or any other complaints. Endorses back pain but denies numbness, weakness, paresthesias, incontinence.

## 2022-05-20 DIAGNOSIS — B34.2 CORONAVIRUS INFECTION, UNSPECIFIED: ICD-10-CM

## 2022-05-20 DIAGNOSIS — N39.0 URINARY TRACT INFECTION, SITE NOT SPECIFIED: ICD-10-CM

## 2022-05-20 LAB
AMPHET UR-MCNC: NEGATIVE — SIGNIFICANT CHANGE UP
ANION GAP SERPL CALC-SCNC: 2 MMOL/L — LOW (ref 5–17)
APPEARANCE UR: CLEAR — SIGNIFICANT CHANGE UP
APTT BLD: 47.6 SEC — HIGH (ref 27.5–35.5)
APTT BLD: 70.8 SEC — HIGH (ref 27.5–35.5)
BACTERIA # UR AUTO: ABNORMAL /HPF
BARBITURATES UR SCN-MCNC: NEGATIVE — SIGNIFICANT CHANGE UP
BENZODIAZ UR-MCNC: POSITIVE
BILIRUB UR-MCNC: NEGATIVE — SIGNIFICANT CHANGE UP
BUN SERPL-MCNC: 9 MG/DL — SIGNIFICANT CHANGE UP (ref 7–18)
CALCIUM SERPL-MCNC: 9.1 MG/DL — SIGNIFICANT CHANGE UP (ref 8.4–10.5)
CHLORIDE SERPL-SCNC: 106 MMOL/L — SIGNIFICANT CHANGE UP (ref 96–108)
CO2 SERPL-SCNC: 32 MMOL/L — HIGH (ref 22–31)
COCAINE METAB.OTHER UR-MCNC: NEGATIVE — SIGNIFICANT CHANGE UP
COLOR SPEC: YELLOW — SIGNIFICANT CHANGE UP
COMMENT - URINE: SIGNIFICANT CHANGE UP
CREAT SERPL-MCNC: 0.43 MG/DL — LOW (ref 0.5–1.3)
DIFF PNL FLD: ABNORMAL
EGFR: 101 ML/MIN/1.73M2 — SIGNIFICANT CHANGE UP
EPI CELLS # UR: SIGNIFICANT CHANGE UP /HPF
GLUCOSE SERPL-MCNC: 118 MG/DL — HIGH (ref 70–99)
GLUCOSE UR QL: NEGATIVE — SIGNIFICANT CHANGE UP
HCT VFR BLD CALC: 38.8 % — SIGNIFICANT CHANGE UP (ref 34.5–45)
HCT VFR BLD CALC: 40.9 % — SIGNIFICANT CHANGE UP (ref 34.5–45)
HCV AB S/CO SERPL IA: 0.12 S/CO — SIGNIFICANT CHANGE UP (ref 0–0.99)
HCV AB SERPL-IMP: SIGNIFICANT CHANGE UP
HGB BLD-MCNC: 13 G/DL — SIGNIFICANT CHANGE UP (ref 11.5–15.5)
HGB BLD-MCNC: 13.2 G/DL — SIGNIFICANT CHANGE UP (ref 11.5–15.5)
KETONES UR-MCNC: NEGATIVE — SIGNIFICANT CHANGE UP
LEUKOCYTE ESTERASE UR-ACNC: ABNORMAL
MAGNESIUM SERPL-MCNC: 2.3 MG/DL — SIGNIFICANT CHANGE UP (ref 1.6–2.6)
MCHC RBC-ENTMCNC: 31.3 PG — SIGNIFICANT CHANGE UP (ref 27–34)
MCHC RBC-ENTMCNC: 31.8 PG — SIGNIFICANT CHANGE UP (ref 27–34)
MCHC RBC-ENTMCNC: 32.3 GM/DL — SIGNIFICANT CHANGE UP (ref 32–36)
MCHC RBC-ENTMCNC: 33.5 GM/DL — SIGNIFICANT CHANGE UP (ref 32–36)
MCV RBC AUTO: 94.9 FL — SIGNIFICANT CHANGE UP (ref 80–100)
MCV RBC AUTO: 96.9 FL — SIGNIFICANT CHANGE UP (ref 80–100)
METHADONE UR-MCNC: NEGATIVE — SIGNIFICANT CHANGE UP
NITRITE UR-MCNC: NEGATIVE — SIGNIFICANT CHANGE UP
NRBC # BLD: 0 /100 WBCS — SIGNIFICANT CHANGE UP (ref 0–0)
NRBC # BLD: 0 /100 WBCS — SIGNIFICANT CHANGE UP (ref 0–0)
OPIATES UR-MCNC: NEGATIVE — SIGNIFICANT CHANGE UP
PCP SPEC-MCNC: SIGNIFICANT CHANGE UP
PCP UR-MCNC: NEGATIVE — SIGNIFICANT CHANGE UP
PH UR: 7 — SIGNIFICANT CHANGE UP (ref 5–8)
PHOSPHATE SERPL-MCNC: 2.9 MG/DL — SIGNIFICANT CHANGE UP (ref 2.5–4.5)
PLATELET # BLD AUTO: 246 K/UL — SIGNIFICANT CHANGE UP (ref 150–400)
PLATELET # BLD AUTO: 248 K/UL — SIGNIFICANT CHANGE UP (ref 150–400)
POTASSIUM SERPL-MCNC: 3.9 MMOL/L — SIGNIFICANT CHANGE UP (ref 3.5–5.3)
POTASSIUM SERPL-SCNC: 3.9 MMOL/L — SIGNIFICANT CHANGE UP (ref 3.5–5.3)
PROT UR-MCNC: NEGATIVE — SIGNIFICANT CHANGE UP
RBC # BLD: 4.09 M/UL — SIGNIFICANT CHANGE UP (ref 3.8–5.2)
RBC # BLD: 4.22 M/UL — SIGNIFICANT CHANGE UP (ref 3.8–5.2)
RBC # FLD: 12.5 % — SIGNIFICANT CHANGE UP (ref 10.3–14.5)
RBC # FLD: 12.6 % — SIGNIFICANT CHANGE UP (ref 10.3–14.5)
RBC CASTS # UR COMP ASSIST: ABNORMAL /HPF (ref 0–2)
SODIUM SERPL-SCNC: 140 MMOL/L — SIGNIFICANT CHANGE UP (ref 135–145)
SP GR SPEC: 1 — LOW (ref 1.01–1.02)
THC UR QL: NEGATIVE — SIGNIFICANT CHANGE UP
TROPONIN I, HIGH SENSITIVITY RESULT: 1040.9 NG/L — HIGH
TROPONIN I, HIGH SENSITIVITY RESULT: 1234.6 NG/L — HIGH
TROPONIN I, HIGH SENSITIVITY RESULT: 1285.5 NG/L — HIGH
UROBILINOGEN FLD QL: NEGATIVE — SIGNIFICANT CHANGE UP
WBC # BLD: 5.95 K/UL — SIGNIFICANT CHANGE UP (ref 3.8–10.5)
WBC # BLD: 7.2 K/UL — SIGNIFICANT CHANGE UP (ref 3.8–10.5)
WBC # FLD AUTO: 5.95 K/UL — SIGNIFICANT CHANGE UP (ref 3.8–10.5)
WBC # FLD AUTO: 7.2 K/UL — SIGNIFICANT CHANGE UP (ref 3.8–10.5)
WBC UR QL: ABNORMAL /HPF (ref 0–5)

## 2022-05-20 PROCEDURE — 72128 CT CHEST SPINE W/O DYE: CPT | Mod: 26

## 2022-05-20 PROCEDURE — 71275 CT ANGIOGRAPHY CHEST: CPT | Mod: 26

## 2022-05-20 PROCEDURE — 93010 ELECTROCARDIOGRAM REPORT: CPT

## 2022-05-20 PROCEDURE — 74174 CTA ABD&PLVS W/CONTRAST: CPT | Mod: 26

## 2022-05-20 PROCEDURE — 72131 CT LUMBAR SPINE W/O DYE: CPT | Mod: 26

## 2022-05-20 PROCEDURE — 72125 CT NECK SPINE W/O DYE: CPT | Mod: 26

## 2022-05-20 RX ORDER — ATORVASTATIN CALCIUM 80 MG/1
40 TABLET, FILM COATED ORAL AT BEDTIME
Refills: 0 | Status: DISCONTINUED | OUTPATIENT
Start: 2022-05-20 | End: 2022-05-23

## 2022-05-20 RX ORDER — AZITHROMYCIN 500 MG/1
TABLET, FILM COATED ORAL
Refills: 0 | Status: DISCONTINUED | OUTPATIENT
Start: 2022-05-20 | End: 2022-05-20

## 2022-05-20 RX ORDER — HEPARIN SODIUM 5000 [USP'U]/ML
INJECTION INTRAVENOUS; SUBCUTANEOUS
Qty: 25000 | Refills: 0 | Status: DISCONTINUED | OUTPATIENT
Start: 2022-05-20 | End: 2022-05-23

## 2022-05-20 RX ORDER — CEFTRIAXONE 500 MG/1
1000 INJECTION, POWDER, FOR SOLUTION INTRAMUSCULAR; INTRAVENOUS EVERY 24 HOURS
Refills: 0 | Status: COMPLETED | OUTPATIENT
Start: 2022-05-20 | End: 2022-05-22

## 2022-05-20 RX ORDER — HEPARIN SODIUM 5000 [USP'U]/ML
3200 INJECTION INTRAVENOUS; SUBCUTANEOUS EVERY 6 HOURS
Refills: 0 | Status: DISCONTINUED | OUTPATIENT
Start: 2022-05-20 | End: 2022-05-23

## 2022-05-20 RX ORDER — ONDANSETRON 8 MG/1
4 TABLET, FILM COATED ORAL EVERY 6 HOURS
Refills: 0 | Status: DISCONTINUED | OUTPATIENT
Start: 2022-05-20 | End: 2022-05-23

## 2022-05-20 RX ORDER — METOPROLOL TARTRATE 50 MG
12.5 TABLET ORAL EVERY 12 HOURS
Refills: 0 | Status: DISCONTINUED | OUTPATIENT
Start: 2022-05-20 | End: 2022-05-23

## 2022-05-20 RX ORDER — ASPIRIN/CALCIUM CARB/MAGNESIUM 324 MG
81 TABLET ORAL DAILY
Refills: 0 | Status: DISCONTINUED | OUTPATIENT
Start: 2022-05-20 | End: 2022-05-23

## 2022-05-20 RX ADMIN — CEFTRIAXONE 100 MILLIGRAM(S): 500 INJECTION, POWDER, FOR SOLUTION INTRAMUSCULAR; INTRAVENOUS at 07:36

## 2022-05-20 RX ADMIN — Medication 12.5 MILLIGRAM(S): at 18:11

## 2022-05-20 RX ADMIN — HEPARIN SODIUM 750 UNIT(S)/HR: 5000 INJECTION INTRAVENOUS; SUBCUTANEOUS at 19:58

## 2022-05-20 RX ADMIN — HEPARIN SODIUM 650 UNIT(S)/HR: 5000 INJECTION INTRAVENOUS; SUBCUTANEOUS at 09:07

## 2022-05-20 RX ADMIN — ATORVASTATIN CALCIUM 40 MILLIGRAM(S): 80 TABLET, FILM COATED ORAL at 23:08

## 2022-05-20 RX ADMIN — Medication 650 MILLIGRAM(S): at 23:44

## 2022-05-20 RX ADMIN — ONDANSETRON 4 MILLIGRAM(S): 8 TABLET, FILM COATED ORAL at 09:57

## 2022-05-20 RX ADMIN — ONDANSETRON 4 MILLIGRAM(S): 8 TABLET, FILM COATED ORAL at 23:09

## 2022-05-20 RX ADMIN — HEPARIN SODIUM 650 UNIT(S)/HR: 5000 INJECTION INTRAVENOUS; SUBCUTANEOUS at 06:56

## 2022-05-20 RX ADMIN — ONDANSETRON 4 MILLIGRAM(S): 8 TABLET, FILM COATED ORAL at 18:11

## 2022-05-20 RX ADMIN — Medication 650 MILLIGRAM(S): at 23:09

## 2022-05-20 RX ADMIN — Medication 81 MILLIGRAM(S): at 11:49

## 2022-05-20 RX ADMIN — HEPARIN SODIUM 750 UNIT(S)/HR: 5000 INJECTION INTRAVENOUS; SUBCUTANEOUS at 13:27

## 2022-05-20 RX ADMIN — HEPARIN SODIUM 750 UNIT(S)/HR: 5000 INJECTION INTRAVENOUS; SUBCUTANEOUS at 19:28

## 2022-05-20 RX ADMIN — HEPARIN SODIUM 650 UNIT(S)/HR: 5000 INJECTION INTRAVENOUS; SUBCUTANEOUS at 08:07

## 2022-05-20 NOTE — CONSULT NOTE ADULT - ASSESSMENT
77y/o F with anxiety, depression presents to the ED with the complaint of chest pain for the past week. Admitted for ACS r/o     #  ·  Problem: Chest pain.   ·  Plan: ACS vs aortic dissection vs MSK   Presented with chest and back pain (not acute)  T1: 163, T2 726  EKG: NSR  Follow T3  CT angio aorta negative for dissection   CKMB and dimer - mildly elevated  Started on heparin drip, aspirin, atorvastatin and metoprolol   F/w echo

## 2022-05-20 NOTE — CONSULT NOTE ADULT - SUBJECTIVE AND OBJECTIVE BOX
PATIENT SEEN AND EXAMINED ON :- 5/20/22  DATE OF SERVICE:   5/20/22          Interim events noted,Labs ,Radiological studies and Cardiology tests reviewed .     HOSPITAL COURSE: HPI:  77y/o F with anxiety, depression presents to the ED with the complaint of chest pain for the past week. Patient states she was lifting a heavy box 2 weeks ago at home after which she started having back pain. For that her son got her a back brace that she put on very tight and has had it on day and night for 2 weeks. Last week she started developing bilateral intermittent chest pain, not related to exercise or exertion. Since the past couple of days she has also been having mild left lower quadrant abdominal pain. She uses 4 pillows at night because of her back but denies orthopnea, dyspnea, PND, lower extremity swelling, palpitations. Has intermittent soft bowel movements but denies constipation, fever, chills, vomiting, melena, hematochezia, or any other complaints. Endorses back pain but denies numbness, weakness, paresthesias, incontinence.  (19 May 2022 22:13)      INTERIM EVENTS:Patient seen at bedside ,interim events noted.      PMH -reviewed admission note, no change since admission  HEART FAILURE: Acute[ ]Chronic[ ] Systolic[ ] Diastolic[ ] Combined Systolic and Diastolic[ ]  CAD[ ] CABG[ ] PCI[ ]  DEVICES[ ] PPM[ ] ICD[ ] ILR[ ]  ATRIAL FIBRILLATION[ ] Paroxysmal[ ] Permanent[ ]  MYNOR[ ] CKD1[ ] CKD2[ ] CKD3[ ] CKD4[ ] ESRD[ ]  COPD[ ] HTN[ ]   DM[ ] Type1[ ] Type 2[ ]   CVA[ ] Paresis[ ]    AMBULATION: Assisted[ ] Cane/walker[ ] Independent[ ]    MEDICATIONS  (STANDING):  aspirin enteric coated 81 milliGRAM(s) Oral daily  atorvastatin 40 milliGRAM(s) Oral at bedtime  cefTRIAXone   IVPB 1000 milliGRAM(s) IV Intermittent every 24 hours  heparin  Infusion.  Unit(s)/Hr (6.5 mL/Hr) IV Continuous <Continuous>  metoprolol tartrate 12.5 milliGRAM(s) Oral every 12 hours  ondansetron Injectable 4 milliGRAM(s) IV Push every 6 hours  senna 2 Tablet(s) Oral at bedtime    MEDICATIONS  (PRN):  acetaminophen     Tablet .. 650 milliGRAM(s) Oral every 6 hours PRN Mild Pain (1 - 3)  heparin   Injectable 3200 Unit(s) IV Push every 6 hours PRN For aPTT less than 40            REVIEW OF SYSTEMS:  Constitutional: [ ] fever, [ ]weight loss,  [ ]fatigue  Eyes: [ ] visual changes  Respiratory: [ ]shortness of breath;  [ ] cough, [ ]wheezing, [ ]chills, [ ]hemoptysis  Cardiovascular: [ ] chest pain, [ ]palpitations, [ ]dizziness,  [ ]leg swelling[ ]orthopnea[ ]PND  Gastrointestinal: [ ] abdominal pain, [ ]nausea, [ ]vomiting,  [ ]diarrhea [ ]Constipation [ ]Melena  Genitourinary: [ ] dysuria, [ ] hematuria [ ]Dupont  Neurologic: [ ] headaches [ ] tremors[ ]weakness [ ]Paralysis Right[ ] Left[ ]  Skin: [ ] itching, [ ]burning, [ ] rashes  Endocrine: [ ] heat or cold intolerance  Musculoskeletal: [ ] joint pain or swelling; [ ] muscle, back, or extremity pain  Psychiatric: [ ] depression, [ ]anxiety, [ ]mood swings, or [ ]difficulty sleeping  Hematologic: [ ] easy bruising, [ ] bleeding gums    [ ] All remaining systems negative except as per above.   [ ]Unable to obtain.  [x] No change in ROS since admission      Vital Signs Last 24 Hrs  T(C): 36.6 (20 May 2022 19:59), Max: 36.9 (20 May 2022 18:06)  T(F): 97.9 (20 May 2022 19:59), Max: 98.4 (20 May 2022 18:06)  HR: 75 (20 May 2022 19:59) (75 - 99)  BP: 98/62 (20 May 2022 19:59) (98/62 - 126/89)  BP(mean): --  RR: 18 (20 May 2022 19:59) (16 - 18)  SpO2: 94% (20 May 2022 19:59) (94% - 97%)  I&O's Summary      PHYSICAL EXAM:  General: No acute distress BMI-  HEENT: EOMI, PERRL  Neck: Supple, [ ] JVD  Lungs: Equal air entry bilaterally; [ ] rales [ ] wheezing [ ] rhonchi  Heart: Regular rate and rhythm; [x ] murmur   2/6 [ x] systolic [ ] diastolic [ ] radiation[ ] rubs [ ]  gallops  Abdomen: Nontender, bowel sounds present  Extremities: No clubbing, cyanosis, [ ] edema [ ]Pulses  equal and intact  Nervous system:  Alert & Oriented X3, no focal deficits  Psychiatric: Normal affect  Skin: No rashes or lesions    LABS:  05-20    140  |  106  |  9   ----------------------------<  118<H>  3.9   |  32<H>  |  0.43<L>    Ca    9.1      20 May 2022 05:49  Phos  2.9     05-20  Mg     2.3     05-20    TPro  7.8  /  Alb  3.4<L>  /  TBili  0.2  /  DBili  x   /  AST  22  /  ALT  18  /  AlkPhos  115  05-19    Creatinine Trend: 0.43<--, 0.54<--                        13.0   5.95  )-----------( 246      ( 20 May 2022 12:54 )             38.8     PT/INR - ( 19 May 2022 16:52 )   PT: 11.4 sec;   INR: 0.96 ratio         PTT - ( 20 May 2022 19:20 )  PTT:70.8 sec          
  Pt Name: STEFANIE MERCADO  MRN: [id number]      ORTHOPEDIC SPINE CONSULT:    Diagnosis:     Patient is a 76y Female   HPI:  75y/o F with anxiety, depression presents to the ED with the complaint of chest pain for the past week. Patient states she was lifting a heavy box 2 weeks ago at home after which she started having back pain. For that her son got her a back brace that she put on very tight and has had it on day and night for 2 weeks. Last week she started developing bilateral intermittent chest pain, not related to exercise or exertion. Since the past couple of days she has also been having mild left lower quadrant abdominal pain. She uses 4 pillows at night because of her back but denies orthopnea, dyspnea, PND, lower extremity swelling, palpitations. Has intermittent soft bowel movements but denies constipation, fever, chills, vomiting, melena, hematochezia, or any other complaints. Endorses back pain but denies numbness, weakness, paresthesias, incontinence.  (19 May 2022 22:13)    History of scoliosis, independent walker with no history of back surgery or injections - complaining of back pain from cspine to lumbar spine that began 2 weeks ago - she used an otc brace tightly, but the pain didn't subside and instead cause abdominal pain due to the length of use and tightness. Denies any numbness, incontinence, weakness of the lower extremities. denies tenderness to touch, only with weightbearing.     HEALTH ISSUES - PROBLEM Dx:  Chest pain    Back pain    High blood pressure    DVT prophylaxis    Anxiety and depression    Acute UTI    Coronavirus infection, unspecified        .    Ambulation: Walking independently [ ] With Cane [ ] With Walker [ ]  Bed / wheelchairbound [ ]     PAST MEDICAL & SURGICAL HISTORY:  Pyelonephritis      Opiate use      Chronic prescription benzodiazepine use      H/O abdominal hysterectomy          Allergies: antihistamines (Anaphylaxis)      Vital Signs Last 24 Hrs  T(C): 36.9 (20 May 2022 18:06), Max: 36.9 (20 May 2022 18:06)  T(F): 98.4 (20 May 2022 18:06), Max: 98.4 (20 May 2022 18:06)  HR: 94 (20 May 2022 15:38) (83 - 99)  BP: 123/67 (20 May 2022 18:06) (98/66 - 126/89)  BP(mean): --  RR: 16 (20 May 2022 18:06) (16 - 18)  SpO2: 97% (20 May 2022 18:06) (94% - 97%)    Physical Exam:  Appearance: Alert, responsive, in no acute distress.  Musculoskeletal:         Left Upper Extremity: Atraumatic with normal alignment NROM. No crepitus. No bony tenderness.        Right Upper Extremity: Atraumatic with normal alignment NROM. No crepitus. No bony tenderness.        Left Lower Extremity: Atraumatic with normal alignment NROM. No crepitus. No bony tenderness. No calf tenderness, Calf soft.       Right Lower Extremity: Atraumatic with normal alignment NROM. No crepitus. No bony tenderness.  No calf tenderness, Calf soft.    Neurological: Sensation is grossly intact to light touch. No focal deficits or weaknesses found.    Motor exam:          Spine: Skin is pink warm, non tender to the entire spine.            Upper extremities: 5/5 strength of the upper extremities, ain/pin/m/r/u nerves, axillary nerves intact bilaterally.            Lower extremities:  5/5 strength of the lower extremities. silt. nvi. distal pulses 2+. negative SLR test bilaterally. able to SLR bilaterally without pain.       Labs:                        13.0   5.95  )-----------( 246      ( 20 May 2022 12:54 )             38.8     05-20    140  |  106  |  9   ----------------------------<  118<H>  3.9   |  32<H>  |  0.43<L>    Ca    9.1      20 May 2022 05:49  Phos  2.9     05-20  Mg     2.3     05-20    TPro  7.8  /  Alb  3.4<L>  /  TBili  0.2  /  DBili  x   /  AST  22  /  ALT  18  /  AlkPhos  115  05-19      Radiology:   < from: CT Thoracic Spine No Cont (05.20.22 @ 04:39) >    ACC: [id number] EXAM:  CT LUMBAR SPINE                        ACC: [id number] EXAM:  CT THORACIC SPINE                          PROCEDURE DATE:  05/20/2022          INTERPRETATION:  CT THORACIC SPINE, CT LUMBAR SPINE    CLINICAL INFORMATION: r/o fracture    TECHNIQUE:  Noncontrast CT.  Axial acquisition. Sagittal and coronal reformations.    FINDINGS:  THORACIC SPINE  There is a moderate dextroscoliosis centered at T7-8. There is   exaggeration of the normal thoracic kyphosis.    There is a mild subacute compression deformity involving the inferior   endplate of T6. Minimal retropulsion without significant spinal canal   narrowing.    There are chronic compression deformities of the inferior greater than   superior endplates of T8 worse along the left. Minimal retropulsion   towards the inferior endplate without significant spinal canal narrowing.    There are chronic compression deformities of the inferior greater than   superior endplates of T9 greater along the left. Mild retropulsion   towards the inferior endplate without significant spinal canal narrowing.    Chronic small Schmorl's nodes superior endplate of T10 and inferior   endplate of T11.    Severe chronic compression deformities superior and inferior endplates of   the T12 vertebra greatest centrally. Minimal retropulsion towards the   superior and inferior endplates without significant canal narrowing.    Multilevel facet degenerative changes greatest towards the thoracolumbar   junction.    LUMBAR SPINE:  Please note when counting inferiorly from the skull base of the fifth   lumbar vertebra is sacralized. Please confirm all levels before any   planned intervention.    There is a mild levoscoliosis centered at L2. Lumbar lordosis is   maintained. Vacuum disc phenomenon is seen at L2-3 through L4-5.    Moderate chronic appearing compression deformities superior and inferior   endplates of L1 centrally/eccentric towards the right.    Mild to moderate chronic appearing compression deformity superior   endplate ofL2 eccentric towards the left.    Moderate chronic appearing endplate deformities L3 central and eccentric   towards the left.    Moderate chronic central endplate deformity inferior endplate of L4   eccentric towards the right.    Mild disc bulge with mild narrowing of the right neural foramina at L2-3.  Mild disc bulge with mild facet degenerative changes L3-4 with mild   narrowing of the right greater than left lateral recesses, moderate   right, and mild left neural foramina narrowing.  Diffuse disc bulge L4-5 with moderate facet degenerative changes with   mild narrowing of the lateral recesses, severe right, and moderate left   neural foramina narrowing.    IMPRESSION:  *  VERTEBRA ARE NUMBERED FROM THE SKULL BASE UTILIZING THE CONVENTION OF   7 CERVICAL, 12 THORACIC AND 5 LUMBAR VERTEBRA. UTILIZING THIS CONVENTION   THE FIFTH LUMBAR VERTEBRA IS SACRALIZED. PLEASE CONFIRM ALL LEVELS BEFORE   ANY PLANNED INTERVENTION.  *  SUBACUTE MILD COMPRESSION DEFORMITY INFERIOR ENDPLATE OF T6.  *  MULTIPLE CHRONIC COMPRESSION DEFORMITIES AS DESCRIBED.  *  MODERATE THORACIC DEXTROSCOLIOSIS. MILD LUMBAR LEVOSCOLIOSIS.  *  DEGENERATIVE CHANGES LUMBOSACRAL SPINE AS DESCRIBED    --- End of Report ---            CASSANDRA TREPETA MD; Attending Radiologist  This document has been electronically signed. May 20 2022  2:40PM    < end of copied text >      Impression:  Pt is a  76y Female with  scoliosis, T6 subacute compression fracture, chronic compression deformities.     Plan:  - Recommendation: Conservative treatment  - Pain management  - DVT ppx with venodynes  - PT- WBAT of the lower extremities. Proper body mechanics.  - Pt is orthopedically stable for discharge  - Case d/w Dr. Valdez  - Follow up with Follow-Up with Dr. Moura in ONE WEEK at [phone]

## 2022-05-20 NOTE — PROGRESS NOTE ADULT - SUBJECTIVE AND OBJECTIVE BOX
PGY-1 Progress Note discussed with attending    PAGER #: [[phone]] TILL 5:00 PM  PLEASE CONTACT ON CALL TEAM:  - On Call Team (Please refer to Blake) FROM 5:00 PM - 8:30PM  - Nightfloat Team FROM 8:30 -7:30 AM    CHIEF COMPLAINT & BRIEF HOSPITAL COURSE:    77y/o F with anxiety, depression presents to the ED with the complaint of chest pain for the past week. Patient states she was lifting a heavy box 2 weeks ago at home after which she started having back pain. For that her son got her a back brace that she put on very tight and has had it on day and night for 2 weeks. Last week she started developing bilateral intermittent chest pain, not related to exercise or exertion. Since the past couple of days she has also been having mild left lower quadrant abdominal pain. She uses 4 pillows at night because of her back but denies orthopnea, dyspnea, PND, lower extremity swelling, palpitations. Has intermittent soft bowel movements but denies constipation, fever, chills, vomiting, melena, hematochezia, or any other complaints. Endorses back pain but denies numbness, weakness, paresthesias, incontinence.     INTERVAL HPI/OVERNIGHT EVENTS:         REVIEW OF SYSTEMS:  CONSTITUTIONAL: No fever, weight loss, or fatigue  RESPIRATORY: No cough, wheezing, chills or hemoptysis; No shortness of breath  CARDIOVASCULAR: No chest pain, palpitations, dizziness, or leg swelling  GASTROINTESTINAL: No abdominal pain. No nausea, vomiting, or hematemesis; No diarrhea or constipation. No melena or hematochezia.  GENITOURINARY: No dysuria or hematuria, urinary frequency  NEUROLOGICAL: No headaches, memory loss, loss of strength, numbness, or tremors  SKIN: No itching, burning, rashes, or lesions     MEDICATIONS  (STANDING):  aspirin enteric coated 81 milliGRAM(s) Oral daily  atorvastatin 40 milliGRAM(s) Oral at bedtime  cefTRIAXone   IVPB 1000 milliGRAM(s) IV Intermittent every 24 hours  heparin  Infusion.  Unit(s)/Hr (6.5 mL/Hr) IV Continuous <Continuous>  metoprolol tartrate 12.5 milliGRAM(s) Oral every 12 hours  ondansetron Injectable 4 milliGRAM(s) IV Push every 6 hours  senna 2 Tablet(s) Oral at bedtime    MEDICATIONS  (PRN):  acetaminophen     Tablet .. 650 milliGRAM(s) Oral every 6 hours PRN Mild Pain (1 - 3)  heparin   Injectable 3200 Unit(s) IV Push every 6 hours PRN For aPTT less than 40      Vital Signs Last 24 Hrs  T(C): 36.6 (20 May 2022 10:55), Max: 37.1 (19 May 2022 14:47)  T(F): 97.9 (20 May 2022 10:55), Max: 98.8 (19 May 2022 14:47)  HR: 83 (20 May 2022 10:55) (83 - 106)  BP: 111/74 (20 May 2022 10:55) (107/71 - 175/101)  BP(mean): --  RR: 17 (20 May 2022 10:55) (17 - 19)  SpO2: 96% (20 May 2022 10:55) (94% - 97%)    PHYSICAL EXAMINATION:  GENERAL: NAD, well built  HEAD:  Atraumatic, Normocephalic  EYES:  conjunctiva and sclera clear  NECK: Supple, No JVD, Normal thyroid  CHEST/LUNG: Clear to auscultation. Clear to percussion bilaterally; No rales, rhonchi, wheezing, or rubs  HEART: Regular rate and rhythm; No murmurs, rubs, or gallops  ABDOMEN: Soft, Nontender, Nondistended; Bowel sounds present, no pain or masses on palpation  NERVOUS SYSTEM:  Alert & Oriented X3  : voiding well  EXTREMITIES:  2+ Peripheral Pulses, No clubbing, cyanosis, or edema  SKIN: warm dry                          13.0   5.95  )-----------( 246      ( 20 May 2022 12:54 )             38.8     05-20    140  |  106  |  9   ----------------------------<  118<H>  3.9   |  32<H>  |  0.43<L>    Ca    9.1      20 May 2022 05:49  Phos  2.9     05-20  Mg     2.3     05-20    TPro  7.8  /  Alb  3.4<L>  /  TBili  0.2  /  DBili  x   /  AST  22  /  ALT  18  /  AlkPhos  115  05-19    LIVER FUNCTIONS - ( 19 May 2022 16:52 )  Alb: 3.4 g/dL / Pro: 7.8 g/dL / ALK PHOS: 115 U/L / ALT: 18 U/L DA / AST: 22 U/L / GGT: x           CARDIAC MARKERS ( 19 May 2022 21:19 )  x     / x     / 90 U/L / x     / 5.6 ng/mL      PT/INR - ( 19 May 2022 16:52 )   PT: 11.4 sec;   INR: 0.96 ratio         PTT - ( 19 May 2022 16:52 )  PTT:30.6 sec    I&O's Summary          CAPILLARY BLOOD GLUCOSE      RADIOLOGY & ADDITIONAL TESTS:                   PGY-1 Progress Note discussed with attending    PAGER #: [[phone]] TILL 5:00 PM  PLEASE CONTACT ON CALL TEAM:  - On Call Team (Please refer to Blake) FROM 5:00 PM - 8:30PM  - Nightfloat Team FROM 8:30 -7:30 AM    CHIEF COMPLAINT & BRIEF HOSPITAL COURSE:    77y/o F with anxiety, depression presents to the ED with the complaint of chest pain for the past week. Patient states she was lifting a heavy box 2 weeks ago at home after which she started having back pain. For that her son got her a back brace that she put on very tight and has had it on day and night for 2 weeks. Last week she started developing bilateral intermittent chest pain, not related to exercise or exertion. Since the past couple of days she has also been having mild left lower quadrant abdominal pain. She uses 4 pillows at night because of her back but denies orthopnea, dyspnea, PND, lower extremity swelling, palpitations. Has intermittent soft bowel movements but denies constipation, fever, chills, vomiting, melena, hematochezia, or any other complaints. Endorses back pain but denies numbness, weakness, paresthesias, incontinence.     INTERVAL HPI/OVERNIGHT EVENTS:     Patient was examined at bedside, AAOx3, stable, NAD. She is crying and wants to see her son. She is a bit confused but reorientable. She tested positive for Coronavirus (not COVID). No acute events since admitted overnight.    REVIEW OF SYSTEMS:  CONSTITUTIONAL: No fever, weight loss, or fatigue  RESPIRATORY: No cough, wheezing, chills or hemoptysis; No shortness of breath  CARDIOVASCULAR: No chest pain, palpitations, dizziness, or leg swelling  GASTROINTESTINAL: No abdominal pain. No nausea, vomiting, or hematemesis; No diarrhea or constipation. No melena or hematochezia.  GENITOURINARY: No dysuria or hematuria, urinary frequency  NEUROLOGICAL: No headaches, memory loss, loss of strength, numbness, or tremors  SKIN: No itching, burning, rashes, or lesions     MEDICATIONS  (STANDING):  aspirin enteric coated 81 milliGRAM(s) Oral daily  atorvastatin 40 milliGRAM(s) Oral at bedtime  cefTRIAXone   IVPB 1000 milliGRAM(s) IV Intermittent every 24 hours  heparin  Infusion.  Unit(s)/Hr (6.5 mL/Hr) IV Continuous <Continuous>  metoprolol tartrate 12.5 milliGRAM(s) Oral every 12 hours  ondansetron Injectable 4 milliGRAM(s) IV Push every 6 hours  senna 2 Tablet(s) Oral at bedtime    MEDICATIONS  (PRN):  acetaminophen     Tablet .. 650 milliGRAM(s) Oral every 6 hours PRN Mild Pain (1 - 3)  heparin   Injectable 3200 Unit(s) IV Push every 6 hours PRN For aPTT less than 40      Vital Signs Last 24 Hrs  T(C): 36.6 (20 May 2022 10:55), Max: 37.1 (19 May 2022 14:47)  T(F): 97.9 (20 May 2022 10:55), Max: 98.8 (19 May 2022 14:47)  HR: 83 (20 May 2022 10:55) (83 - 106)  BP: 111/74 (20 May 2022 10:55) (107/71 - 175/101)  BP(mean): --  RR: 17 (20 May 2022 10:55) (17 - 19)  SpO2: 96% (20 May 2022 10:55) (94% - 97%)    PHYSICAL EXAMINATION:  GENERAL: NAD  HEAD:  Atraumatic, Normocephalic  EYES:  conjunctiva and sclera clear  NECK: Supple, No JVD, Normal thyroid  CHEST/LUNG: Clear to auscultation. Clear to percussion bilaterally; No rales, rhonchi, wheezing, or rubs  HEART: Regular rate and rhythm; No murmurs, rubs, or gallops  ABDOMEN: Soft, Nontender, Nondistended; Bowel sounds present, no pain or masses on palpation  NERVOUS SYSTEM:  Alert & Oriented X3  : voiding well  EXTREMITIES:  2+ Peripheral Pulses, No clubbing, cyanosis, or edema  SKIN: warm dry                          13.0   5.95  )-----------( 246      ( 20 May 2022 12:54 )             38.8     05-20    140  |  106  |  9   ----------------------------<  118<H>  3.9   |  32<H>  |  0.43<L>    Ca    9.1      20 May 2022 05:49  Phos  2.9     05-20  Mg     2.3     05-20    TPro  7.8  /  Alb  3.4<L>  /  TBili  0.2  /  DBili  x   /  AST  22  /  ALT  18  /  AlkPhos  115  05-19    LIVER FUNCTIONS - ( 19 May 2022 16:52 )  Alb: 3.4 g/dL / Pro: 7.8 g/dL / ALK PHOS: 115 U/L / ALT: 18 U/L DA / AST: 22 U/L / GGT: x           CARDIAC MARKERS ( 19 May 2022 21:19 )  x     / x     / 90 U/L / x     / 5.6 ng/mL      PT/INR - ( 19 May 2022 16:52 )   PT: 11.4 sec;   INR: 0.96 ratio         PTT - ( 19 May 2022 16:52 )  PTT:30.6 sec    I&O's Summary          CAPILLARY BLOOD GLUCOSE      RADIOLOGY & ADDITIONAL TESTS:                   PGY-1 Progress Note discussed with attending    PAGER #: [[phone]] TILL 5:00 PM  PLEASE CONTACT ON CALL TEAM:  - On Call Team (Please refer to Blake) FROM 5:00 PM - 8:30PM  - Nightfloat Team FROM 8:30 -7:30 AM    CHIEF COMPLAINT & BRIEF HOSPITAL COURSE:    75y/o F with anxiety, depression presents to the ED with the complaint of chest pain for the past week. Patient states she was lifting a heavy box 2 weeks ago at home after which she started having back pain. For that her son got her a back brace that she put on very tight and has had it on day and night for 2 weeks. Last week she started developing bilateral intermittent chest pain, not related to exercise or exertion. Since the past couple of days she has also been having mild left lower quadrant abdominal pain. She uses 4 pillows at night because of her back but denies orthopnea, dyspnea, PND, lower extremity swelling, palpitations. Has intermittent soft bowel movements but denies constipation, fever, chills, vomiting, melena, hematochezia, or any other complaints. Endorses back pain but denies numbness, weakness, paresthesias, incontinence.     INTERVAL HPI/OVERNIGHT EVENTS:     Patient was examined at bedside, AAOx3, stable, NAD. She is crying and wants to see her son. She is a bit confused but reorientable. She tested positive for Coronavirus (not COVID). No acute events since admitted overnight. PT recommended home PT.    REVIEW OF SYSTEMS:  CONSTITUTIONAL: No fever, weight loss, or fatigue  RESPIRATORY: No cough, wheezing, chills or hemoptysis; No shortness of breath  CARDIOVASCULAR: No chest pain, palpitations, dizziness, or leg swelling  GASTROINTESTINAL: No abdominal pain. No nausea, vomiting, or hematemesis; No diarrhea or constipation. No melena or hematochezia.  GENITOURINARY: No dysuria or hematuria, urinary frequency  NEUROLOGICAL: No headaches, memory loss, loss of strength, numbness, or tremors  SKIN: No itching, burning, rashes, or lesions     MEDICATIONS  (STANDING):  aspirin enteric coated 81 milliGRAM(s) Oral daily  atorvastatin 40 milliGRAM(s) Oral at bedtime  cefTRIAXone   IVPB 1000 milliGRAM(s) IV Intermittent every 24 hours  heparin  Infusion.  Unit(s)/Hr (6.5 mL/Hr) IV Continuous <Continuous>  metoprolol tartrate 12.5 milliGRAM(s) Oral every 12 hours  ondansetron Injectable 4 milliGRAM(s) IV Push every 6 hours  senna 2 Tablet(s) Oral at bedtime    MEDICATIONS  (PRN):  acetaminophen     Tablet .. 650 milliGRAM(s) Oral every 6 hours PRN Mild Pain (1 - 3)  heparin   Injectable 3200 Unit(s) IV Push every 6 hours PRN For aPTT less than 40      Vital Signs Last 24 Hrs  T(C): 36.6 (20 May 2022 10:55), Max: 37.1 (19 May 2022 14:47)  T(F): 97.9 (20 May 2022 10:55), Max: 98.8 (19 May 2022 14:47)  HR: 83 (20 May 2022 10:55) (83 - 106)  BP: 111/74 (20 May 2022 10:55) (107/71 - 175/101)  BP(mean): --  RR: 17 (20 May 2022 10:55) (17 - 19)  SpO2: 96% (20 May 2022 10:55) (94% - 97%)    PHYSICAL EXAMINATION:  GENERAL: NAD  HEAD:  Atraumatic, Normocephalic  EYES:  conjunctiva and sclera clear  NECK: Supple, No JVD, Normal thyroid  CHEST/LUNG: Clear to auscultation. Clear to percussion bilaterally; No rales, rhonchi, wheezing, or rubs  HEART: Regular rate and rhythm; No murmurs, rubs, or gallops  ABDOMEN: Soft, Nontender, Nondistended; Bowel sounds present, no pain or masses on palpation  NERVOUS SYSTEM:  Alert & Oriented X3  : voiding well  EXTREMITIES:  2+ Peripheral Pulses, No clubbing, cyanosis, or edema  SKIN: warm dry                          13.0   5.95  )-----------( 246      ( 20 May 2022 12:54 )             38.8     05-20    140  |  106  |  9   ----------------------------<  118<H>  3.9   |  32<H>  |  0.43<L>    Ca    9.1      20 May 2022 05:49  Phos  2.9     05-20  Mg     2.3     05-20    TPro  7.8  /  Alb  3.4<L>  /  TBili  0.2  /  DBili  x   /  AST  22  /  ALT  18  /  AlkPhos  115  05-19    LIVER FUNCTIONS - ( 19 May 2022 16:52 )  Alb: 3.4 g/dL / Pro: 7.8 g/dL / ALK PHOS: 115 U/L / ALT: 18 U/L DA / AST: 22 U/L / GGT: x           CARDIAC MARKERS ( 19 May 2022 21:19 )  x     / x     / 90 U/L / x     / 5.6 ng/mL      PT/INR - ( 19 May 2022 16:52 )   PT: 11.4 sec;   INR: 0.96 ratio         PTT - ( 19 May 2022 16:52 )  PTT:30.6 sec    I&O's Summary          CAPILLARY BLOOD GLUCOSE      RADIOLOGY & ADDITIONAL TESTS:

## 2022-05-20 NOTE — CHART NOTE - NSCHARTNOTEFT_GEN_A_CORE
Pt. signed out to Dr. Kip Trinidad.    77y/o F with anxiety, depression presents to the ED with the complaint of chest pain for the past week after lifting heavy box 2 weeks ago, wearing a back brace for back pain for 1 week.    CTA chest was done to r/o dissection given chest/back pain and showed: No acute aortic syndrome. Mild left lower lobe groundglass, likely inflammatory. 3 month follow-up   is recommended. Suspected acute or subacute T7 compression fracture. Correlate with same day CT spine.  CT abd/pelvis done showing distended bladder and mild colonic stool burden -> Bladder scan ordered, however patient refused.  She also refused any bowel regimen stating she was having normal BM's.    RVP pos for coronovirus, covid neg.  UA with pyuria, few bacteria, and +leuk esterase, She was started on rocephin.    Troponin notable for 163 -> 726 -> 1234 -> 1285: She was started on heparin gtt, cardiology Dr. Casper was consulted.   EKGs showed NSR, possible LAE, no acute ST-T changes.   Comparing 5/19 to 5/20 EKG's: nonspecific ST-T changes noted. (poss Q wave in V3, and III)    Allergies    antihistamines (Anaphylaxis)    Intolerances      Vital Signs Last 24 Hrs  T(C): 36.6 (20 May 2022 09:10), Max: 37.1 (19 May 2022 14:47)  T(F): 97.9 (20 May 2022 09:10), Max: 98.8 (19 May 2022 14:47)  HR: 83 (20 May 2022 09:10) (83 - 106)  BP: 126/89 (20 May 2022 09:10) (107/71 - 175/101)  BP(mean): --  RR: 17 (20 May 2022 09:10) (17 - 19)  SpO2: 96% (20 May 2022 09:10) (94% - 97%)    MedsMEDICATIONS  (STANDING):  aspirin enteric coated 81 milliGRAM(s) Oral daily  atorvastatin 40 milliGRAM(s) Oral at bedtime  cefTRIAXone   IVPB 1000 milliGRAM(s) IV Intermittent every 24 hours  heparin  Infusion.  Unit(s)/Hr (6.5 mL/Hr) IV Continuous <Continuous>  metoprolol tartrate 12.5 milliGRAM(s) Oral every 12 hours  ondansetron Injectable 4 milliGRAM(s) IV Push every 6 hours  senna 2 Tablet(s) Oral at bedtime    MEDICATIONS  (PRN):  acetaminophen     Tablet .. 650 milliGRAM(s) Oral every 6 hours PRN Mild Pain (1 - 3)  heparin   Injectable 3200 Unit(s) IV Push every 6 hours PRN For aPTT less than 40      LABS:                        13.2   7.20  )-----------( 248      ( 20 May 2022 05:49 )             40.9     05-20    140  |  106  |  9   ----------------------------<  118<H>  3.9   |  32<H>  |  0.43<L>    Ca    9.1      20 May 2022 05:49  Phos  2.9     05-20  Mg     2.3     05-20    TPro  7.8  /  Alb  3.4<L>  /  TBili  0.2  /  DBili  x   /  AST  22  /  ALT  18  /  AlkPhos  115  05-19    PT/INR - ( 19 May 2022 16:52 )   PT: 11.4 sec;   INR: 0.96 ratio         PTT - ( 19 May 2022 16:52 )  PTT:30.6 sec    - Cardiology c/s pending, CT spine pending, Consider repeating troponin.  Signed out above to Dr. Trinidad.    Heriberto Lema NP

## 2022-05-20 NOTE — PROGRESS NOTE ADULT - PROBLEM SELECTOR PLAN 2
Hx of scoliosis   Presents with back pain   Follow C, T, L spine CT to r/o fracture vs disk herniation   CXR: Chronic thoracolumbar scoliosis along with midthoracic compression fractures of indeterminate age  Tylenol for pain   PT consult   Fall precautions   Ortho consult in the am Hx of scoliosis   Presents with back pain   C, T, L spine CT to r/o fracture vs disk herniation - dexttroscoliosis, compression deformity  CXR: Chronic thoracolumbar scoliosis along with midthoracic compression fractures of indeterminate age  Tylenol for pain   PT consult - home PT  Fall precautions   Ortho consulted

## 2022-05-20 NOTE — PROGRESS NOTE ADULT - PROBLEM SELECTOR PLAN 6
IMPROVE VTE Individual Risk Assessment  RISK                                                                Points  [  ] Previous VTE                                                  3  [  ] Thrombophilia                                               2  [  ] Lower limb paralysis                                      2        (unable to hold up >15 seconds)    [  ] Current Cancer                                              2         (within 6 months)  [x  ] Immobilization > 24 hrs                                1  [  ] ICU/CCU stay > 24 hours                              1  [x  ] Age > 60                                                      1  IMPROVE VTE Score _________2, -- for DVT proph  scd boots till CT angio results not on any medications at home  States she does not have a pharmacy  Utox+ BZD

## 2022-05-20 NOTE — PATIENT PROFILE ADULT - FALL HARM RISK - HARM RISK INTERVENTIONS

## 2022-05-20 NOTE — PROGRESS NOTE ADULT - PROBLEM SELECTOR PLAN 1
ACS vs aortic dissection vs MSK   Presented with chest and back pain (not acute)  T1: 163, T2 726  EKG: NSR  Follow T3  prelim report or CT angio aorta negative for dissection   Follow CKMB and dimer  Started on heparin drip, aspirin, atorvastatin and metoprolol   Follow Echo  Dr. Casper consulted ACS vs aortic dissection vs MSK   Presented with chest and back pain (not acute)  T1: 163, T2 726  EKG: NSR  Follow T3  CT angio aorta negative for dissection   CKMB and dimer - mildly elevated  Started on heparin drip, aspirin, atorvastatin and metoprolol   Follow Echo  Dr. Casper consulted

## 2022-05-20 NOTE — PROGRESS NOTE ADULT - PROBLEM SELECTOR PLAN 3
Complaining of mild abdominal pain   Mild distension of bladder on imaging  Refusing bladder scan and coley  UA+   Started on ceftriaxone

## 2022-05-20 NOTE — DISCHARGE NOTE PROVIDER - PROVIDER RX CONTACT NUMBER
Portions of this note are generated with voice recognition software. Typographical errors may exist.     SUBJECTIVE:    This is a/an 88 y.o. female here for primary care visit for  Chief Complaint   Patient presents with    Leg Swelling     Patient states that she is on a new dose of Lasix.  60 mg daily and has been on this for about 2 weeks.  States that lower extremity edema has been improving gradually.  Voices no significant problems with orthostatic dizziness.    Patient continues to follow with pain management.  There has been no change in opiate dosage and utilization.    Anxiety and restless legs.  Patient still has evenings where lower extremity neuropathic pain symptoms fail to respond to opiate medication.  Patient has an anxiety reaction and has significant difficulty winding down and getting back to sleep.  Patient remains unwilling to go to pain psychiatrist at [facility] because of transportation.  Voices reluctance to seek local psychiatric services if not specialized for chronic pain.    Patient states that she has not had a bone density scan in the last 2 years.  She is not taking bisphosphonate therapy.      Medications Reviewed and Updated    Past medical, family, and social histories were reviewed and updated.    Review of Systems negative unless otherwise noted in history of present illness-   General ROS: negative  Psychological ROS: negative  ENT ROS: negative  Allergy and Immunology ROS: negative  Cardiovascular ROS: negative  Gastrointestinal ROS: negative  Genito-Urinary ROS: negative  Musculoskeletal ROS: negative  Neurological ROS: negative  Dermatological ROS: negative      Allergic:    Review of patient's allergies indicates:   Allergen Reactions    Citalopram      Other reaction(s): agitation    Codeine Nausea Only    Sulfa (sulfonamide antibiotics)      Other reaction(s): Swelling    Xanax [alprazolam] Other (See Comments)     Unspecified reaction        OBJECTIVE:  BP: (!) 110/58 Pulse: 90    Wt Readings from Last 3 Encounters:   05/18/17 52.7 kg (116 lb 2.9 oz)   04/20/17 54.2 kg (119 lb 7.8 oz)   03/23/17 53.1 kg (117 lb)    Body mass index is 27 kg/m².  Previous Blood Pressure Readings :   BP Readings from Last 3 Encounters:   05/18/17 (!) 110/58   04/20/17 122/68   03/23/17 133/69       GEN: No apparent distress  HEENT: sclera non-icteric, conjunctiva clear  CV: no peripheral edema  PULM: breathing non-labored  ABD: Obese, protuberant abdomen.  PSYCH: appropriate affect  MSK: able to rise from chair without assistance  SKIN: normal skin turgor    Pertinent Labs Reviewed       ASSESSMENT/PLAN:    Age-related osteoporosis without current pathological fracture.  Clinical follow-up warranted.  Recommendations as below  -     DXA Bone Density Appendicular Skeleton; Future; Expected date: 05/18/2017  -     DXA Bone Density Spine And Hip; Future; Expected date: 05/18/2017  -     Vitamin D; Future; Expected date: 05/18/2017    Chronic diastolic heart failure improving.  Recommend patient follow with cardiology.  -     Comprehensive metabolic panel; Future; Expected date: 05/18/2017  -     Brain natriuretic peptide; Future; Expected date: 05/18/2017          Future Appointments  Date Time Provider Department Center   5/25/2017 11:00 AM [facility], DEXA1 [facility] [place]   5/25/2017 11:30 AM [facility], DEXA1 [facility] [place]   6/29/2017 11:00 AM Pino Estrada MD [facility]       Pino Estrada  5/21/2017  10:25 PM       [phone]

## 2022-05-20 NOTE — DISCHARGE NOTE PROVIDER - NSCORESITESY/N_GEN_A_CORE_RD
Pt is calling to get U/S gallbladder results. She looked at them on [employer], just wondering what you suggest she do now.   
Yes

## 2022-05-20 NOTE — CONSULT NOTE ADULT - TIME BILLING
- Review of records, telemetry, vital signs and daily labs.   - General and cardiovascular physical examination.  - Generation of cardiovascular treatment plan.  - Coordination of care.      Patient was seen and examined by me on 5/20/22,interim events noted,labs and radiology studies reviewed.  Liang Casper MD,FACC.  [address] [place], [place] [id number].  [phone]

## 2022-05-20 NOTE — DISCHARGE NOTE PROVIDER - NSDCCPCAREPLAN_GEN_ALL_CORE_FT
PRINCIPAL DISCHARGE DIAGNOSIS  Diagnosis: Chest pain  Assessment and Plan of Treatment:       SECONDARY DISCHARGE DIAGNOSES  Diagnosis: Back pain  Assessment and Plan of Treatment:     Diagnosis: Acute UTI  Assessment and Plan of Treatment:     Diagnosis: Coronavirus infection, unspecified  Assessment and Plan of Treatment:     Diagnosis: Hypertension  Assessment and Plan of Treatment:     Diagnosis: Anxiety and depression  Assessment and Plan of Treatment:

## 2022-05-20 NOTE — PROGRESS NOTE ADULT - PROBLEM SELECTOR PLAN 5
not on any medications at home  States she does not have a pharmacy  Follow Utox No hx of HTN  /101  Will repeat BP and give labetalol If persistently elevated  Started on metoprolol for ACS

## 2022-05-20 NOTE — PROGRESS NOTE ADULT - PROBLEM SELECTOR PLAN 4
No hx of HTN  /101  Will repeat BP and give labetalol If persistently elevated  Started on metoprolol for ACS +coronavirus (not COVID-19)  CXR - neg  self-limited  supportive care  no need for isolatiom

## 2022-05-20 NOTE — DISCHARGE NOTE PROVIDER - NSDCMRMEDTOKEN_GEN_ALL_CORE_FT
amoxicillin-clavulanate 875 mg-125 mg oral tablet: 1 tab(s) orally 2 times a day   dextroamphetamine 10 mg oral tablet: 4 tab(s) orally once a day  diazePAM 10 mg oral tablet: 1 tab(s) orally 2 times a day  K-PHOS tablet: 1 tab(s) orally 3 times a day  sertraline 100 mg oral tablet: 1 tab(s) orally 2 times a day  silver sulfADIAZINE 1% topical cream: 1 application topically 2 times a day  ziprasidone 20 mg oral capsule: 1 cap(s) orally 2 times a day, with meals

## 2022-05-20 NOTE — PHYSICAL THERAPY INITIAL EVALUATION ADULT - GENERAL OBSERVATIONS, REHAB EVAL
patient tele referral, denies chest pain, on heparin drip, presents independent commode brp and bsd ambulation with steady gait

## 2022-05-21 LAB
ALBUMIN SERPL ELPH-MCNC: 2.8 G/DL — LOW (ref 3.5–5)
ALP SERPL-CCNC: 99 U/L — SIGNIFICANT CHANGE UP (ref 40–120)
ALT FLD-CCNC: 14 U/L DA — SIGNIFICANT CHANGE UP (ref 10–60)
ANION GAP SERPL CALC-SCNC: 6 MMOL/L — SIGNIFICANT CHANGE UP (ref 5–17)
APTT BLD: 54.3 SEC — HIGH (ref 27.5–35.5)
APTT BLD: 76 SEC — HIGH (ref 27.5–35.5)
APTT BLD: 78.2 SEC — HIGH (ref 27.5–35.5)
AST SERPL-CCNC: 20 U/L — SIGNIFICANT CHANGE UP (ref 10–40)
BASOPHILS # BLD AUTO: 0.02 K/UL — SIGNIFICANT CHANGE UP (ref 0–0.2)
BASOPHILS NFR BLD AUTO: 0.5 % — SIGNIFICANT CHANGE UP (ref 0–2)
BILIRUB SERPL-MCNC: 0.2 MG/DL — SIGNIFICANT CHANGE UP (ref 0.2–1.2)
BUN SERPL-MCNC: 11 MG/DL — SIGNIFICANT CHANGE UP (ref 7–18)
CALCIUM SERPL-MCNC: 9.3 MG/DL — SIGNIFICANT CHANGE UP (ref 8.4–10.5)
CHLORIDE SERPL-SCNC: 105 MMOL/L — SIGNIFICANT CHANGE UP (ref 96–108)
CO2 SERPL-SCNC: 28 MMOL/L — SIGNIFICANT CHANGE UP (ref 22–31)
CREAT SERPL-MCNC: 0.55 MG/DL — SIGNIFICANT CHANGE UP (ref 0.5–1.3)
EGFR: 96 ML/MIN/1.73M2 — SIGNIFICANT CHANGE UP
EOSINOPHIL # BLD AUTO: 0.06 K/UL — SIGNIFICANT CHANGE UP (ref 0–0.5)
EOSINOPHIL NFR BLD AUTO: 1.4 % — SIGNIFICANT CHANGE UP (ref 0–6)
GLUCOSE SERPL-MCNC: 135 MG/DL — HIGH (ref 70–99)
HCT VFR BLD CALC: 39 % — SIGNIFICANT CHANGE UP (ref 34.5–45)
HGB BLD-MCNC: 12.9 G/DL — SIGNIFICANT CHANGE UP (ref 11.5–15.5)
IMM GRANULOCYTES NFR BLD AUTO: 0.2 % — SIGNIFICANT CHANGE UP (ref 0–1.5)
LYMPHOCYTES # BLD AUTO: 0.92 K/UL — LOW (ref 1–3.3)
LYMPHOCYTES # BLD AUTO: 22.1 % — SIGNIFICANT CHANGE UP (ref 13–44)
MAGNESIUM SERPL-MCNC: 2.3 MG/DL — SIGNIFICANT CHANGE UP (ref 1.6–2.6)
MCHC RBC-ENTMCNC: 31.8 PG — SIGNIFICANT CHANGE UP (ref 27–34)
MCHC RBC-ENTMCNC: 33.1 GM/DL — SIGNIFICANT CHANGE UP (ref 32–36)
MCV RBC AUTO: 96.1 FL — SIGNIFICANT CHANGE UP (ref 80–100)
MONOCYTES # BLD AUTO: 0.48 K/UL — SIGNIFICANT CHANGE UP (ref 0–0.9)
MONOCYTES NFR BLD AUTO: 11.5 % — SIGNIFICANT CHANGE UP (ref 2–14)
NEUTROPHILS # BLD AUTO: 2.68 K/UL — SIGNIFICANT CHANGE UP (ref 1.8–7.4)
NEUTROPHILS NFR BLD AUTO: 64.3 % — SIGNIFICANT CHANGE UP (ref 43–77)
NRBC # BLD: 0 /100 WBCS — SIGNIFICANT CHANGE UP (ref 0–0)
PHOSPHATE SERPL-MCNC: 3.9 MG/DL — SIGNIFICANT CHANGE UP (ref 2.5–4.5)
PLATELET # BLD AUTO: 234 K/UL — SIGNIFICANT CHANGE UP (ref 150–400)
POTASSIUM SERPL-MCNC: 4.1 MMOL/L — SIGNIFICANT CHANGE UP (ref 3.5–5.3)
POTASSIUM SERPL-SCNC: 4.1 MMOL/L — SIGNIFICANT CHANGE UP (ref 3.5–5.3)
PROT SERPL-MCNC: 6.6 G/DL — SIGNIFICANT CHANGE UP (ref 6–8.3)
RBC # BLD: 4.06 M/UL — SIGNIFICANT CHANGE UP (ref 3.8–5.2)
RBC # FLD: 12.6 % — SIGNIFICANT CHANGE UP (ref 10.3–14.5)
SODIUM SERPL-SCNC: 139 MMOL/L — SIGNIFICANT CHANGE UP (ref 135–145)
WBC # BLD: 4.17 K/UL — SIGNIFICANT CHANGE UP (ref 3.8–10.5)
WBC # FLD AUTO: 4.17 K/UL — SIGNIFICANT CHANGE UP (ref 3.8–10.5)

## 2022-05-21 RX ADMIN — Medication 650 MILLIGRAM(S): at 07:33

## 2022-05-21 RX ADMIN — SENNA PLUS 2 TABLET(S): 8.6 TABLET ORAL at 21:45

## 2022-05-21 RX ADMIN — HEPARIN SODIUM 700 UNIT(S)/HR: 5000 INJECTION INTRAVENOUS; SUBCUTANEOUS at 02:31

## 2022-05-21 RX ADMIN — HEPARIN SODIUM 650 UNIT(S)/HR: 5000 INJECTION INTRAVENOUS; SUBCUTANEOUS at 08:54

## 2022-05-21 RX ADMIN — HEPARIN SODIUM 700 UNIT(S)/HR: 5000 INJECTION INTRAVENOUS; SUBCUTANEOUS at 07:31

## 2022-05-21 RX ADMIN — CEFTRIAXONE 100 MILLIGRAM(S): 500 INJECTION, POWDER, FOR SOLUTION INTRAMUSCULAR; INTRAVENOUS at 06:49

## 2022-05-21 RX ADMIN — ATORVASTATIN CALCIUM 40 MILLIGRAM(S): 80 TABLET, FILM COATED ORAL at 21:46

## 2022-05-21 RX ADMIN — HEPARIN SODIUM 650 UNIT(S)/HR: 5000 INJECTION INTRAVENOUS; SUBCUTANEOUS at 16:49

## 2022-05-21 RX ADMIN — Medication 650 MILLIGRAM(S): at 06:49

## 2022-05-21 RX ADMIN — ONDANSETRON 4 MILLIGRAM(S): 8 TABLET, FILM COATED ORAL at 11:50

## 2022-05-21 RX ADMIN — Medication 650 MILLIGRAM(S): at 16:20

## 2022-05-21 RX ADMIN — HEPARIN SODIUM 650 UNIT(S)/HR: 5000 INJECTION INTRAVENOUS; SUBCUTANEOUS at 19:45

## 2022-05-21 RX ADMIN — HEPARIN SODIUM 650 UNIT(S)/HR: 5000 INJECTION INTRAVENOUS; SUBCUTANEOUS at 12:42

## 2022-05-21 RX ADMIN — Medication 81 MILLIGRAM(S): at 11:50

## 2022-05-21 RX ADMIN — Medication 650 MILLIGRAM(S): at 15:46

## 2022-05-21 NOTE — PROGRESS NOTE ADULT - SUBJECTIVE AND OBJECTIVE BOX
PATIENT SEEN AND EXAMINED ON :- 5/21/22  DATE OF SERVICE: 5/21/22            Interim events noted,Labs ,Radiological studies and Cardiology tests reviewed .     HOSPITAL COURSE: HPI:  77y/o F with anxiety, depression presents to the ED with the complaint of chest pain for the past week. Patient states she was lifting a heavy box 2 weeks ago at home after which she started having back pain. For that her son got her a back brace that she put on very tight and has had it on day and night for 2 weeks. Last week she started developing bilateral intermittent chest pain, not related to exercise or exertion. Since the past couple of days she has also been having mild left lower quadrant abdominal pain. She uses 4 pillows at night because of her back but denies orthopnea, dyspnea, PND, lower extremity swelling, palpitations. Has intermittent soft bowel movements but denies constipation, fever, chills, vomiting, melena, hematochezia, or any other complaints. Endorses back pain but denies numbness, weakness, paresthesias, incontinence.  (19 May 2022 22:13)      INTERIM EVENTS:Patient seen at bedside ,interim events noted.      PMH -reviewed admission note, no change since admission  HEART FAILURE: Acute[ ]Chronic[ ] Systolic[ ] Diastolic[ ] Combined Systolic and Diastolic[ ]  CAD[ ] CABG[ ] PCI[ ]  DEVICES[ ] PPM[ ] ICD[ ] ILR[ ]  ATRIAL FIBRILLATION[ ] Paroxysmal[ ] Permanent[ ]  MYNOR[ ] CKD1[ ] CKD2[ ] CKD3[ ] CKD4[ ] ESRD[ ]  COPD[ ] HTN[ ]   DM[ ] Type1[ ] Type 2[ ]   CVA[ ] Paresis[ ]    AMBULATION: Assisted[ ] Cane/walker[ ] Independent[ ]    MEDICATIONS  (STANDING):  aspirin enteric coated 81 milliGRAM(s) Oral daily  atorvastatin 40 milliGRAM(s) Oral at bedtime  cefTRIAXone   IVPB 1000 milliGRAM(s) IV Intermittent every 24 hours  heparin  Infusion.  Unit(s)/Hr (6.5 mL/Hr) IV Continuous <Continuous>  metoprolol tartrate 12.5 milliGRAM(s) Oral every 12 hours  ondansetron Injectable 4 milliGRAM(s) IV Push every 6 hours  senna 2 Tablet(s) Oral at bedtime    MEDICATIONS  (PRN):  acetaminophen     Tablet .. 650 milliGRAM(s) Oral every 6 hours PRN Mild Pain (1 - 3)  heparin   Injectable 3200 Unit(s) IV Push every 6 hours PRN For aPTT less than 40            REVIEW OF SYSTEMS:  Constitutional: [ ] fever, [ ]weight loss,  [ ]fatigue  Eyes: [ ] visual changes  Respiratory: [ ]shortness of breath;  [ ] cough, [ ]wheezing, [ ]chills, [ ]hemoptysis  Cardiovascular: [ ] chest pain, [ ]palpitations, [ ]dizziness,  [ ]leg swelling[ ]orthopnea[ ]PND  Gastrointestinal: [ ] abdominal pain, [ ]nausea, [ ]vomiting,  [ ]diarrhea [ ]Constipation [ ]Melena  Genitourinary: [ ] dysuria, [ ] hematuria [ ]Dupont  Neurologic: [ ] headaches [ ] tremors[ ]weakness [ ]Paralysis Right[ ] Left[ ]  Skin: [ ] itching, [ ]burning, [ ] rashes  Endocrine: [ ] heat or cold intolerance  Musculoskeletal: [ ] joint pain or swelling; [ ] muscle, back, or extremity pain  Psychiatric: [ ] depression, [ ]anxiety, [ ]mood swings, or [ ]difficulty sleeping  Hematologic: [ ] easy bruising, [ ] bleeding gums    [ ] All remaining systems negative except as per above.   [ ]Unable to obtain.  [x] No change in ROS since admission      Vital Signs Last 24 Hrs  T(C): 36.3 (21 May 2022 11:33), Max: 36.9 (20 May 2022 18:06)  T(F): 97.3 (21 May 2022 11:33), Max: 98.4 (20 May 2022 18:06)  HR: 72 (21 May 2022 11:33) (71 - 104)  BP: 99/53 (21 May 2022 11:33) (98/62 - 123/67)  BP(mean): --  RR: 18 (21 May 2022 11:33) (16 - 18)  SpO2: 95% (21 May 2022 11:33) (94% - 97%)  I&O's Summary      PHYSICAL EXAM:  General: No acute distress BMI-  HEENT: EOMI, PERRL  Neck: Supple, [ ] JVD  Lungs: Equal air entry bilaterally; [ ] rales [ ] wheezing [ ] rhonchi  Heart: Regular rate and rhythm; [x ] murmur   2/6 [ x] systolic [ ] diastolic [ ] radiation[ ] rubs [ ]  gallops  Abdomen: Nontender, bowel sounds present  Extremities: No clubbing, cyanosis, [ ] edema [ ]Pulses  equal and intact  Nervous system:  Alert & Oriented X3, no focal deficits  Psychiatric: Normal affect  Skin: No rashes or lesions    LABS:  05-21    139  |  105  |  11  ----------------------------<  135<H>  4.1   |  28  |  0.55    Ca    9.3      21 May 2022 07:43  Phos  3.9     05-21  Mg     2.3     05-21    TPro  6.6  /  Alb  2.8<L>  /  TBili  0.2  /  DBili  x   /  AST  20  /  ALT  14  /  AlkPhos  99  05-21    Creatinine Trend: 0.55<--, 0.43<--, 0.54<--                        12.9   4.17  )-----------( 234      ( 21 May 2022 07:43 )             39.0     PT/INR - ( 19 May 2022 16:52 )   PT: 11.4 sec;   INR: 0.96 ratio         PTT - ( 21 May 2022 08:40 )  PTT:76.0 sec

## 2022-05-21 NOTE — PROGRESS NOTE ADULT - PROBLEM SELECTOR PLAN 2
Hx of scoliosis   Presents with back pain   C, T, L spine CT to r/o fracture vs disk herniation - dexttroscoliosis, compression deformity  CXR: Chronic thoracolumbar scoliosis along with midthoracic compression fractures of indeterminate age  Tylenol for pain   PT consult - home PT  Fall precautions   Ortho consulted

## 2022-05-21 NOTE — PROGRESS NOTE ADULT - PROBLEM SELECTOR PLAN 1
ACS vs aortic dissection vs MSK   Presented with chest and back pain (not acute)  T1: 163, T2 726  EKG: NSR  Follow T3  CT angio aorta negative for dissection   CKMB and dimer - mildly elevated  Started on heparin drip, aspirin, atorvastatin and metoprolol   Follow Echo

## 2022-05-21 NOTE — PROGRESS NOTE ADULT - PROBLEM SELECTOR PLAN 1
ACS vs aortic dissection vs MSK   Presented with chest and back pain (not acute)  T1: 163, T2 726  EKG: NSR  Follow T3  CT angio aorta negative for dissection   CKMB and dimer - mildly elevated  Started on heparin drip, aspirin, atorvastatin and metoprolol   Follow Echo  Dr. Casper consulted

## 2022-05-21 NOTE — PROGRESS NOTE ADULT - SUBJECTIVE AND OBJECTIVE BOX
pt seen and examined      REVIEW OF SYSTEMS:  CONSTITUTIONAL: No fever, weight loss, or fatigue  RESPIRATORY: No cough, wheezing, chills or hemoptysis; No shortness of breath  CARDIOVASCULAR: No chest pain, palpitations, dizziness, or leg swelling  GASTROINTESTINAL: No abdominal pain. No nausea, vomiting, or hematemesis; No diarrhea or constipation. No melena or hematochezia.  GENITOURINARY: No dysuria or hematuria, urinary frequency  NEUROLOGICAL: No headaches, memory loss, loss of strength, numbness, or tremors  SKIN: No itching, burning, rashes, or lesions     MEDICATIONS  (STANDING):  aspirin enteric coated 81 milliGRAM(s) Oral daily  atorvastatin 40 milliGRAM(s) Oral at bedtime  cefTRIAXone   IVPB 1000 milliGRAM(s) IV Intermittent every 24 hours  heparin  Infusion.  Unit(s)/Hr (6.5 mL/Hr) IV Continuous <Continuous>  metoprolol tartrate 12.5 milliGRAM(s) Oral every 12 hours  ondansetron Injectable 4 milliGRAM(s) IV Push every 6 hours  senna 2 Tablet(s) Oral at bedtime    MEDICATIONS  (PRN):  acetaminophen     Tablet .. 650 milliGRAM(s) Oral every 6 hours PRN Mild Pain (1 - 3)  heparin   Injectable 3200 Unit(s) IV Push every 6 hours PRN For aPTT less than 40    Vital Signs Last 24 Hrs  T(C): 36.6 (22 @ 20:40), Max: 36.7 (22 @ 04:50)  T(F): 97.8 (22 @ 20:40), Max: 98 (22 @ 04:50)  HR: 83 (22 @ 20:40) (71 - 102)  BP: 94/56 (22 @ 20:40) (94/56 - 110/68)  BP(mean): --  RR: 19 (22 @ 20:40) (17 - 19)  SpO2: 94% (22 @ 20:40) (94% - 97%)      PHYSICAL EXAMINATION:  GENERAL: NAD  HEAD:  Atraumatic, Normocephalic  EYES:  conjunctiva and sclera clear  NECK: Supple, No JVD, Normal thyroid  CHEST/LUNG: dec breath sounds at bases  HEART: Regular rate and rhythm; No murmurs, rubs, or gallops  ABDOMEN: Soft, Nontender, Nondistended; Bowel sounds present, no pain or masses on palpation  NERVOUS SYSTEM:  Alert & Oriented X3  : voiding well  EXTREMITIES:  2+ Peripheral Pulses, No clubbing, cyanosis, or edema  SKIN: warm dry      LABS:      139  |  105  |  11  ----------------------------<  135<H>  4.1   |  28  |  0.55    Ca    9.3      21 May 2022 07:43  Phos  3.9       Mg     2.3         TPro  6.6  /  Alb  2.8<L>  /  TBili  0.2  /  DBili      /  AST  20  /  ALT  14  /  AlkPhos  99      Creatinine Trend: 0.55 <--, 0.43 <--, 0.54 <--                        12.9   4.17  )-----------( 234      ( 21 May 2022 07:43 )             39.0     Urine Studies:  Urinalysis Basic - ( 20 May 2022 03:47 )    Color: Yellow / Appearance: Clear / S.005 / pH:   Gluc:  / Ketone: Negative  / Bili: Negative / Urobili: Negative   Blood:  / Protein: Negative / Nitrite: Negative   Leuk Esterase: Moderate / RBC: 2-5 /HPF / WBC 6-10 /HPF   Sq Epi:  / Non Sq Epi: Few /HPF / Bacteria: Few /HPF              LIVER FUNCTIONS - ( 21 May 2022 07:43 )  Alb: 2.8 g/dL / Pro: 6.6 g/dL / ALK PHOS: 99 U/L / ALT: 14 U/L DA / AST: 20 U/L / GGT: x           PTT - ( 21 May 2022 23:31 )  PTT:56.6 sec

## 2022-05-21 NOTE — PROGRESS NOTE ADULT - PROBLEM SELECTOR PLAN 5
No hx of HTN  /101  Will repeat BP and give labetalol If persistently elevated  Started on metoprolol for ACS

## 2022-05-22 LAB
-  AMIKACIN: SIGNIFICANT CHANGE UP
-  AMOXICILLIN/CLAVULANIC ACID: SIGNIFICANT CHANGE UP
-  AMPICILLIN/SULBACTAM: SIGNIFICANT CHANGE UP
-  AMPICILLIN: SIGNIFICANT CHANGE UP
-  AZTREONAM: SIGNIFICANT CHANGE UP
-  CEFAZOLIN: SIGNIFICANT CHANGE UP
-  CEFEPIME: SIGNIFICANT CHANGE UP
-  CEFOXITIN: SIGNIFICANT CHANGE UP
-  CEFTRIAXONE: SIGNIFICANT CHANGE UP
-  CIPROFLOXACIN: SIGNIFICANT CHANGE UP
-  ERTAPENEM: SIGNIFICANT CHANGE UP
-  GENTAMICIN: SIGNIFICANT CHANGE UP
-  IMIPENEM: SIGNIFICANT CHANGE UP
-  LEVOFLOXACIN: SIGNIFICANT CHANGE UP
-  MEROPENEM: SIGNIFICANT CHANGE UP
-  NITROFURANTOIN: SIGNIFICANT CHANGE UP
-  PIPERACILLIN/TAZOBACTAM: SIGNIFICANT CHANGE UP
-  TIGECYCLINE: SIGNIFICANT CHANGE UP
-  TOBRAMYCIN: SIGNIFICANT CHANGE UP
-  TRIMETHOPRIM/SULFAMETHOXAZOLE: SIGNIFICANT CHANGE UP
ALBUMIN SERPL ELPH-MCNC: 2.7 G/DL — LOW (ref 3.5–5)
ALP SERPL-CCNC: 98 U/L — SIGNIFICANT CHANGE UP (ref 40–120)
ALT FLD-CCNC: 14 U/L DA — SIGNIFICANT CHANGE UP (ref 10–60)
ANION GAP SERPL CALC-SCNC: 6 MMOL/L — SIGNIFICANT CHANGE UP (ref 5–17)
APTT BLD: 56.6 SEC — HIGH (ref 27.5–35.5)
AST SERPL-CCNC: 16 U/L — SIGNIFICANT CHANGE UP (ref 10–40)
BASOPHILS # BLD AUTO: 0.02 K/UL — SIGNIFICANT CHANGE UP (ref 0–0.2)
BASOPHILS NFR BLD AUTO: 0.5 % — SIGNIFICANT CHANGE UP (ref 0–2)
BILIRUB SERPL-MCNC: 0.2 MG/DL — SIGNIFICANT CHANGE UP (ref 0.2–1.2)
BUN SERPL-MCNC: 8 MG/DL — SIGNIFICANT CHANGE UP (ref 7–18)
CALCIUM SERPL-MCNC: 9.2 MG/DL — SIGNIFICANT CHANGE UP (ref 8.4–10.5)
CHLORIDE SERPL-SCNC: 105 MMOL/L — SIGNIFICANT CHANGE UP (ref 96–108)
CO2 SERPL-SCNC: 31 MMOL/L — SIGNIFICANT CHANGE UP (ref 22–31)
CREAT SERPL-MCNC: 0.56 MG/DL — SIGNIFICANT CHANGE UP (ref 0.5–1.3)
CULTURE RESULTS: SIGNIFICANT CHANGE UP
EGFR: 95 ML/MIN/1.73M2 — SIGNIFICANT CHANGE UP
EOSINOPHIL # BLD AUTO: 0.06 K/UL — SIGNIFICANT CHANGE UP (ref 0–0.5)
EOSINOPHIL NFR BLD AUTO: 1.6 % — SIGNIFICANT CHANGE UP (ref 0–6)
GLUCOSE SERPL-MCNC: 103 MG/DL — HIGH (ref 70–99)
HCT VFR BLD CALC: 41.3 % — SIGNIFICANT CHANGE UP (ref 34.5–45)
HGB BLD-MCNC: 13 G/DL — SIGNIFICANT CHANGE UP (ref 11.5–15.5)
IMM GRANULOCYTES NFR BLD AUTO: 0.3 % — SIGNIFICANT CHANGE UP (ref 0–1.5)
LYMPHOCYTES # BLD AUTO: 0.97 K/UL — LOW (ref 1–3.3)
LYMPHOCYTES # BLD AUTO: 25.7 % — SIGNIFICANT CHANGE UP (ref 13–44)
MAGNESIUM SERPL-MCNC: 2.3 MG/DL — SIGNIFICANT CHANGE UP (ref 1.6–2.6)
MCHC RBC-ENTMCNC: 31.4 PG — SIGNIFICANT CHANGE UP (ref 27–34)
MCHC RBC-ENTMCNC: 31.5 GM/DL — LOW (ref 32–36)
MCV RBC AUTO: 99.8 FL — SIGNIFICANT CHANGE UP (ref 80–100)
METHOD TYPE: SIGNIFICANT CHANGE UP
MONOCYTES # BLD AUTO: 0.55 K/UL — SIGNIFICANT CHANGE UP (ref 0–0.9)
MONOCYTES NFR BLD AUTO: 14.6 % — HIGH (ref 2–14)
NEUTROPHILS # BLD AUTO: 2.17 K/UL — SIGNIFICANT CHANGE UP (ref 1.8–7.4)
NEUTROPHILS NFR BLD AUTO: 57.3 % — SIGNIFICANT CHANGE UP (ref 43–77)
NRBC # BLD: 0 /100 WBCS — SIGNIFICANT CHANGE UP (ref 0–0)
ORGANISM # SPEC MICROSCOPIC CNT: SIGNIFICANT CHANGE UP
ORGANISM # SPEC MICROSCOPIC CNT: SIGNIFICANT CHANGE UP
PHOSPHATE SERPL-MCNC: 3.9 MG/DL — SIGNIFICANT CHANGE UP (ref 2.5–4.5)
PLATELET # BLD AUTO: 239 K/UL — SIGNIFICANT CHANGE UP (ref 150–400)
POTASSIUM SERPL-MCNC: 4.4 MMOL/L — SIGNIFICANT CHANGE UP (ref 3.5–5.3)
POTASSIUM SERPL-SCNC: 4.4 MMOL/L — SIGNIFICANT CHANGE UP (ref 3.5–5.3)
PROT SERPL-MCNC: 6.4 G/DL — SIGNIFICANT CHANGE UP (ref 6–8.3)
RBC # BLD: 4.14 M/UL — SIGNIFICANT CHANGE UP (ref 3.8–5.2)
RBC # FLD: 12.5 % — SIGNIFICANT CHANGE UP (ref 10.3–14.5)
SODIUM SERPL-SCNC: 142 MMOL/L — SIGNIFICANT CHANGE UP (ref 135–145)
SPECIMEN SOURCE: SIGNIFICANT CHANGE UP
WBC # BLD: 3.78 K/UL — LOW (ref 3.8–10.5)
WBC # FLD AUTO: 3.78 K/UL — LOW (ref 3.8–10.5)

## 2022-05-22 RX ADMIN — CEFTRIAXONE 100 MILLIGRAM(S): 500 INJECTION, POWDER, FOR SOLUTION INTRAMUSCULAR; INTRAVENOUS at 06:33

## 2022-05-22 RX ADMIN — HEPARIN SODIUM 650 UNIT(S)/HR: 5000 INJECTION INTRAVENOUS; SUBCUTANEOUS at 21:56

## 2022-05-22 RX ADMIN — HEPARIN SODIUM 650 UNIT(S)/HR: 5000 INJECTION INTRAVENOUS; SUBCUTANEOUS at 07:21

## 2022-05-22 RX ADMIN — HEPARIN SODIUM 650 UNIT(S)/HR: 5000 INJECTION INTRAVENOUS; SUBCUTANEOUS at 00:23

## 2022-05-22 RX ADMIN — SENNA PLUS 2 TABLET(S): 8.6 TABLET ORAL at 21:52

## 2022-05-22 RX ADMIN — ATORVASTATIN CALCIUM 40 MILLIGRAM(S): 80 TABLET, FILM COATED ORAL at 21:52

## 2022-05-22 RX ADMIN — ONDANSETRON 4 MILLIGRAM(S): 8 TABLET, FILM COATED ORAL at 05:50

## 2022-05-22 RX ADMIN — Medication 12.5 MILLIGRAM(S): at 05:46

## 2022-05-22 RX ADMIN — Medication 81 MILLIGRAM(S): at 11:09

## 2022-05-22 RX ADMIN — HEPARIN SODIUM 650 UNIT(S)/HR: 5000 INJECTION INTRAVENOUS; SUBCUTANEOUS at 19:35

## 2022-05-22 NOTE — DIETITIAN INITIAL EVALUATION ADULT - OTHER INFO
Pt visited. Son at bedside. Pt  states NKFA. Pt is Vegan. At home Pt drinks Smoothies w  blue berries  made from soy milk . No weight loss reported. HT 62 inches, -125 lb. No chewing  or swallowing difficulty Reported. Pt with poor Dentition. But denies chewing difficulty.

## 2022-05-22 NOTE — PROGRESS NOTE ADULT - SUBJECTIVE AND OBJECTIVE BOX
PATIENT SEEN AND EXAMINED ON :- 5/22/22  DATE OF SERVICE:  5/22/22           Interim events noted,Labs ,Radiological studies and Cardiology tests reviewed .     HOSPITAL COURSE: HPI:  75y/o F with anxiety, depression presents to the ED with the complaint of chest pain for the past week. Patient states she was lifting a heavy box 2 weeks ago at home after which she started having back pain. For that her son got her a back brace that she put on very tight and has had it on day and night for 2 weeks. Last week she started developing bilateral intermittent chest pain, not related to exercise or exertion. Since the past couple of days she has also been having mild left lower quadrant abdominal pain. She uses 4 pillows at night because of her back but denies orthopnea, dyspnea, PND, lower extremity swelling, palpitations. Has intermittent soft bowel movements but denies constipation, fever, chills, vomiting, melena, hematochezia, or any other complaints. Endorses back pain but denies numbness, weakness, paresthesias, incontinence.  (19 May 2022 22:13)      INTERIM EVENTS:Patient seen at bedside ,interim events noted.      PMH -reviewed admission note, no change since admission  HEART FAILURE: Acute[ ]Chronic[ ] Systolic[ ] Diastolic[ ] Combined Systolic and Diastolic[ ]  CAD[ ] CABG[ ] PCI[ ]  DEVICES[ ] PPM[ ] ICD[ ] ILR[ ]  ATRIAL FIBRILLATION[ ] Paroxysmal[ ] Permanent[ ]  MYNOR[ ] CKD1[ ] CKD2[ ] CKD3[ ] CKD4[ ] ESRD[ ]  COPD[ ] HTN[ ]   DM[ ] Type1[ ] Type 2[ ]   CVA[ ] Paresis[ ]    AMBULATION: Assisted[ ] Cane/walker[ ] Independent[ ]    MEDICATIONS  (STANDING):  aspirin enteric coated 81 milliGRAM(s) Oral daily  atorvastatin 40 milliGRAM(s) Oral at bedtime  heparin  Infusion.  Unit(s)/Hr (6.5 mL/Hr) IV Continuous <Continuous>  metoprolol tartrate 12.5 milliGRAM(s) Oral every 12 hours  ondansetron Injectable 4 milliGRAM(s) IV Push every 6 hours  senna 2 Tablet(s) Oral at bedtime    MEDICATIONS  (PRN):  acetaminophen     Tablet .. 650 milliGRAM(s) Oral every 6 hours PRN Mild Pain (1 - 3)  heparin   Injectable 3200 Unit(s) IV Push every 6 hours PRN For aPTT less than 40            REVIEW OF SYSTEMS:  Constitutional: [ ] fever, [ ]weight loss,  [ ]fatigue  Eyes: [ ] visual changes  Respiratory: [ ]shortness of breath;  [ ] cough, [ ]wheezing, [ ]chills, [ ]hemoptysis  Cardiovascular: [ ] chest pain, [ ]palpitations, [ ]dizziness,  [ ]leg swelling[ ]orthopnea[ ]PND  Gastrointestinal: [ ] abdominal pain, [ ]nausea, [ ]vomiting,  [ ]diarrhea [ ]Constipation [ ]Melena  Genitourinary: [ ] dysuria, [ ] hematuria [ ]Dupont  Neurologic: [ ] headaches [ ] tremors[ ]weakness [ ]Paralysis Right[ ] Left[ ]  Skin: [ ] itching, [ ]burning, [ ] rashes  Endocrine: [ ] heat or cold intolerance  Musculoskeletal: [ ] joint pain or swelling; [ ] muscle, back, or extremity pain  Psychiatric: [ ] depression, [ ]anxiety, [ ]mood swings, or [ ]difficulty sleeping  Hematologic: [ ] easy bruising, [ ] bleeding gums    [ ] All remaining systems negative except as per above.   [ ]Unable to obtain.  [x] No change in ROS since admission      Vital Signs Last 24 Hrs  T(C): 36.7 (22 May 2022 15:52), Max: 37 (22 May 2022 07:38)  T(F): 98.1 (22 May 2022 15:52), Max: 98.6 (22 May 2022 07:38)  HR: 75 (22 May 2022 15:52) (63 - 93)  BP: 96/62 (22 May 2022 15:52) (94/56 - 129/72)  BP(mean): --  RR: 18 (22 May 2022 15:52) (17 - 19)  SpO2: 94% (22 May 2022 15:52) (94% - 96%)  I&O's Summary    21 May 2022 07:01  -  22 May 2022 07:00  --------------------------------------------------------  IN: 200 mL / OUT: 975 mL / NET: -775 mL        PHYSICAL EXAM:  General: No acute distress BMI-  HEENT: EOMI, PERRL  Neck: Supple, [ ] JVD  Lungs: Equal air entry bilaterally; [ ] rales [ ] wheezing [ ] rhonchi  Heart: Regular rate and rhythm; [x ] murmur   2/6 [ x] systolic [ ] diastolic [ ] radiation[ ] rubs [ ]  gallops  Abdomen: Nontender, bowel sounds present  Extremities: No clubbing, cyanosis, [ ] edema [ ]Pulses  equal and intact  Nervous system:  Alert & Oriented X3, no focal deficits  Psychiatric: Normal affect  Skin: No rashes or lesions    LABS:  05-22    142  |  105  |  8   ----------------------------<  103<H>  4.4   |  31  |  0.56    Ca    9.2      22 May 2022 07:15  Phos  3.9     05-22  Mg     2.3     05-22    TPro  6.4  /  Alb  2.7<L>  /  TBili  0.2  /  DBili  x   /  AST  16  /  ALT  14  /  AlkPhos  98  05-22    Creatinine Trend: 0.56<--, 0.55<--, 0.43<--, 0.54<--                        13.0   3.78  )-----------( 239      ( 22 May 2022 07:15 )             41.3     PTT - ( 21 May 2022 23:31 )  PTT:56.6 sec

## 2022-05-22 NOTE — PROGRESS NOTE ADULT - PROBLEM SELECTOR PROBLEM 1
Please approve    Due MM labs 7/2018 - called and spoke to pt - lab slip to [facility], pt will call in July after labs for ov    Chest pain

## 2022-05-22 NOTE — PROGRESS NOTE ADULT - TIME BILLING
- Review of records, telemetry, vital signs and daily labs.   - General and cardiovascular physical examination.  - Generation of cardiovascular treatment plan.  - Coordination of care.      Patient was seen and examined by me  5/21/22events noted,labs and radiology studies reviewed.  Liang Casper MD,FACC.  [address] [place], [place] [id number].  [phone]
- Review of records, telemetry, vital signs and daily labs.   - General and cardiovascular physical examination.  - Generation of cardiovascular treatment plan.  - Coordination of care.      Patient was seen and examined by me  5/22/22events noted,labs and radiology studies reviewed.  Liang Casper MD,FACC.  [address] [place], [place] [id number].  [phone]

## 2022-05-22 NOTE — PROGRESS NOTE ADULT - SUBJECTIVE AND OBJECTIVE BOX
pt seen and examined      REVIEW OF SYSTEMS:  CONSTITUTIONAL: No fever, weight loss, or fatigue  RESPIRATORY: No cough, wheezing, chills or hemoptysis; No shortness of breath  CARDIOVASCULAR: No chest pain, palpitations, dizziness, or leg swelling  GASTROINTESTINAL: No abdominal pain. No nausea, vomiting, or hematemesis; No diarrhea or constipation. No melena or hematochezia.  GENITOURINARY: No dysuria or hematuria, urinary frequency  NEUROLOGICAL: No headaches, memory loss, loss of strength, numbness, or tremors  SKIN: No itching, burning, rashes, or lesions     MEDICATIONS  (STANDING):  aspirin enteric coated 81 milliGRAM(s) Oral daily  atorvastatin 40 milliGRAM(s) Oral at bedtime  heparin  Infusion.  Unit(s)/Hr (6.5 mL/Hr) IV Continuous <Continuous>  metoprolol tartrate 12.5 milliGRAM(s) Oral every 12 hours  ondansetron Injectable 4 milliGRAM(s) IV Push every 6 hours  senna 2 Tablet(s) Oral at bedtime    MEDICATIONS  (PRN):  acetaminophen     Tablet .. 650 milliGRAM(s) Oral every 6 hours PRN Mild Pain (1 - 3)  heparin   Injectable 3200 Unit(s) IV Push every 6 hours PRN For aPTT less than 40    Vital Signs Last 24 Hrs  T(C): 36.5 (22 @ 23:52), Max: 37 (22 @ 07:38)  T(F): 97.7 (22 @ 23:52), Max: 98.6 (22 @ 07:38)  HR: 88 (22 @ 23:52) (63 - 93)  BP: 106/49 (22 @ 23:52) (96/62 - 129/72)  BP(mean): --  RR: 17 (22 @ 23:52) (17 - 19)  SpO2: 94% (22 @ 23:52) (94% - 95%)      PHYSICAL EXAMINATION:  GENERAL: NAD  HEAD:  Atraumatic, Normocephalic  EYES:  conjunctiva and sclera clear  NECK: Supple, No JVD, Normal thyroid  CHEST/LUNG: dec breath sounds at bases  HEART: Regular rate and rhythm; No murmurs, rubs, or gallops  ABDOMEN: Soft, Nontender, Nondistended; Bowel sounds present, no pain or masses on palpation  NERVOUS SYSTEM:  Alert & Oriented X3  : voiding well  EXTREMITIES:  2+ Peripheral Pulses, No clubbing, cyanosis, or edema  SKIN: warm dry      LABS:      142  |  105  |  8   ----------------------------<  103<H>  4.4   |  31  |  0.56    Ca    9.2      22 May 2022 07:15  Phos  3.9       Mg     2.3         TPro  6.4  /  Alb  2.7<L>  /  TBili  0.2  /  DBili      /  AST  16  /  ALT  14  /  AlkPhos  98      Creatinine Trend: 0.56 <--, 0.55 <--, 0.43 <--, 0.54 <--                        13.0   3.78  )-----------( 239      ( 22 May 2022 07:15 )             41.3     Urine Studies:  Urinalysis Basic - ( 20 May 2022 03:47 )    Color: Yellow / Appearance: Clear / S.005 / pH:   Gluc:  / Ketone: Negative  / Bili: Negative / Urobili: Negative   Blood:  / Protein: Negative / Nitrite: Negative   Leuk Esterase: Moderate / RBC: 2-5 /HPF / WBC 6-10 /HPF   Sq Epi:  / Non Sq Epi: Few /HPF / Bacteria: Few /HPF              LIVER FUNCTIONS - ( 22 May 2022 07:15 )  Alb: 2.7 g/dL / Pro: 6.4 g/dL / ALK PHOS: 98 U/L / ALT: 14 U/L DA / AST: 16 U/L / GGT: x           PTT - ( 21 May 2022 23:31 )  PTT:56.6 sec

## 2022-05-22 NOTE — PROGRESS NOTE ADULT - PROBLEM SELECTOR PLAN 6
Nadiya Galaviz [address] [place], [address]  Phone [phone]   Fax [phone]      OFFICE VISIT: 2021    Lenka Handy [place]-: not on any medications at home  States she does not have a pharmacy  Utox+ BZD

## 2022-05-22 NOTE — DIETITIAN INITIAL EVALUATION ADULT - PERTINENT MEDS FT
MEDICATIONS  (STANDING):  aspirin enteric coated 81 milliGRAM(s) Oral daily  atorvastatin 40 milliGRAM(s) Oral at bedtime  heparin  Infusion.  Unit(s)/Hr (6.5 mL/Hr) IV Continuous <Continuous>  metoprolol tartrate 12.5 milliGRAM(s) Oral every 12 hours  ondansetron Injectable 4 milliGRAM(s) IV Push every 6 hours  senna 2 Tablet(s) Oral at bedtime    MEDICATIONS  (PRN):  acetaminophen     Tablet .. 650 milliGRAM(s) Oral every 6 hours PRN Mild Pain (1 - 3)  heparin   Injectable 3200 Unit(s) IV Push every 6 hours PRN For aPTT less than 40

## 2022-05-22 NOTE — DIETITIAN INITIAL EVALUATION ADULT - PERTINENT LABORATORY DATA
05-22    142  |  105  |  8   ----------------------------<  103<H>  4.4   |  31  |  0.56    Ca    9.2      22 May 2022 07:15  Phos  3.9     05-22  Mg     2.3     05-22    TPro  6.4  /  Alb  2.7<L>  /  TBili  0.2  /  DBili  x   /  AST  16  /  ALT  14  /  AlkPhos  98  05-22

## 2022-05-23 ENCOUNTER — INPATIENT (INPATIENT)
Facility: HOSPITAL | Age: 77
LOS: 0 days | Discharge: HOME HEALTH | End: 2022-05-24
Attending: INTERNAL MEDICINE | Admitting: INTERNAL MEDICINE
Payer: MEDICARE

## 2022-05-23 VITALS
RESPIRATION RATE: 18 BRPM | HEIGHT: 62 IN | HEART RATE: 76 BPM | SYSTOLIC BLOOD PRESSURE: 120 MMHG | DIASTOLIC BLOOD PRESSURE: 73 MMHG | WEIGHT: 123.46 LBS | TEMPERATURE: 98 F | OXYGEN SATURATION: 100 %

## 2022-05-23 VITALS
HEART RATE: 72 BPM | SYSTOLIC BLOOD PRESSURE: 127 MMHG | DIASTOLIC BLOOD PRESSURE: 67 MMHG | OXYGEN SATURATION: 98 % | TEMPERATURE: 97 F | RESPIRATION RATE: 18 BRPM

## 2022-05-23 DIAGNOSIS — Z98.890 OTHER SPECIFIED POSTPROCEDURAL STATES: Chronic | ICD-10-CM

## 2022-05-23 DIAGNOSIS — R07.9 CHEST PAIN, UNSPECIFIED: ICD-10-CM

## 2022-05-23 DIAGNOSIS — I21.4 NON-ST ELEVATION (NSTEMI) MYOCARDIAL INFARCTION: ICD-10-CM

## 2022-05-23 DIAGNOSIS — Z90.710 ACQUIRED ABSENCE OF BOTH CERVIX AND UTERUS: Chronic | ICD-10-CM

## 2022-05-23 LAB
ALBUMIN SERPL ELPH-MCNC: 2.6 G/DL — LOW (ref 3.5–5)
ALP SERPL-CCNC: 101 U/L — SIGNIFICANT CHANGE UP (ref 40–120)
ALT FLD-CCNC: 15 U/L DA — SIGNIFICANT CHANGE UP (ref 10–60)
ANION GAP SERPL CALC-SCNC: 5 MMOL/L — SIGNIFICANT CHANGE UP (ref 5–17)
APTT BLD: 62.7 SEC — HIGH (ref 27.5–35.5)
AST SERPL-CCNC: 18 U/L — SIGNIFICANT CHANGE UP (ref 10–40)
BASOPHILS # BLD AUTO: 0.02 K/UL — SIGNIFICANT CHANGE UP (ref 0–0.2)
BASOPHILS NFR BLD AUTO: 0.6 % — SIGNIFICANT CHANGE UP (ref 0–2)
BILIRUB SERPL-MCNC: 0.2 MG/DL — SIGNIFICANT CHANGE UP (ref 0.2–1.2)
BUN SERPL-MCNC: 7 MG/DL — SIGNIFICANT CHANGE UP (ref 7–18)
CALCIUM SERPL-MCNC: 9 MG/DL — SIGNIFICANT CHANGE UP (ref 8.4–10.5)
CHLORIDE SERPL-SCNC: 107 MMOL/L — SIGNIFICANT CHANGE UP (ref 96–108)
CO2 SERPL-SCNC: 31 MMOL/L — SIGNIFICANT CHANGE UP (ref 22–31)
CREAT SERPL-MCNC: 0.46 MG/DL — LOW (ref 0.5–1.3)
EGFR: 100 ML/MIN/1.73M2 — SIGNIFICANT CHANGE UP
EOSINOPHIL # BLD AUTO: 0.04 K/UL — SIGNIFICANT CHANGE UP (ref 0–0.5)
EOSINOPHIL NFR BLD AUTO: 1.2 % — SIGNIFICANT CHANGE UP (ref 0–6)
GLUCOSE BLDC GLUCOMTR-MCNC: 97 MG/DL — SIGNIFICANT CHANGE UP (ref 70–99)
GLUCOSE SERPL-MCNC: 92 MG/DL — SIGNIFICANT CHANGE UP (ref 70–99)
HCT VFR BLD CALC: 39.8 % — SIGNIFICANT CHANGE UP (ref 34.5–45)
HGB BLD-MCNC: 12.8 G/DL — SIGNIFICANT CHANGE UP (ref 11.5–15.5)
IMM GRANULOCYTES NFR BLD AUTO: 0.3 % — SIGNIFICANT CHANGE UP (ref 0–1.5)
LYMPHOCYTES # BLD AUTO: 1.16 K/UL — SIGNIFICANT CHANGE UP (ref 1–3.3)
LYMPHOCYTES # BLD AUTO: 34.5 % — SIGNIFICANT CHANGE UP (ref 13–44)
MAGNESIUM SERPL-MCNC: 2.2 MG/DL — SIGNIFICANT CHANGE UP (ref 1.6–2.6)
MCHC RBC-ENTMCNC: 31.5 PG — SIGNIFICANT CHANGE UP (ref 27–34)
MCHC RBC-ENTMCNC: 32.2 GM/DL — SIGNIFICANT CHANGE UP (ref 32–36)
MCV RBC AUTO: 98 FL — SIGNIFICANT CHANGE UP (ref 80–100)
MONOCYTES # BLD AUTO: 0.46 K/UL — SIGNIFICANT CHANGE UP (ref 0–0.9)
MONOCYTES NFR BLD AUTO: 13.7 % — SIGNIFICANT CHANGE UP (ref 2–14)
NEUTROPHILS # BLD AUTO: 1.67 K/UL — LOW (ref 1.8–7.4)
NEUTROPHILS NFR BLD AUTO: 49.7 % — SIGNIFICANT CHANGE UP (ref 43–77)
NRBC # BLD: 0 /100 WBCS — SIGNIFICANT CHANGE UP (ref 0–0)
PHOSPHATE SERPL-MCNC: 3.3 MG/DL — SIGNIFICANT CHANGE UP (ref 2.5–4.5)
PLATELET # BLD AUTO: 225 K/UL — SIGNIFICANT CHANGE UP (ref 150–400)
POTASSIUM SERPL-MCNC: 3.8 MMOL/L — SIGNIFICANT CHANGE UP (ref 3.5–5.3)
POTASSIUM SERPL-SCNC: 3.8 MMOL/L — SIGNIFICANT CHANGE UP (ref 3.5–5.3)
PROT SERPL-MCNC: 6.2 G/DL — SIGNIFICANT CHANGE UP (ref 6–8.3)
RBC # BLD: 4.06 M/UL — SIGNIFICANT CHANGE UP (ref 3.8–5.2)
RBC # FLD: 12.6 % — SIGNIFICANT CHANGE UP (ref 10.3–14.5)
SODIUM SERPL-SCNC: 143 MMOL/L — SIGNIFICANT CHANGE UP (ref 135–145)
WBC # BLD: 3.36 K/UL — LOW (ref 3.8–10.5)
WBC # FLD AUTO: 3.36 K/UL — LOW (ref 3.8–10.5)

## 2022-05-23 PROCEDURE — 71275 CT ANGIOGRAPHY CHEST: CPT

## 2022-05-23 PROCEDURE — 96375 TX/PRO/DX INJ NEW DRUG ADDON: CPT

## 2022-05-23 PROCEDURE — 99285 EMERGENCY DEPT VISIT HI MDM: CPT

## 2022-05-23 PROCEDURE — 74174 CTA ABD&PLVS W/CONTRAST: CPT

## 2022-05-23 PROCEDURE — 74176 CT ABD & PELVIS W/O CONTRAST: CPT | Mod: MA

## 2022-05-23 PROCEDURE — 84100 ASSAY OF PHOSPHORUS: CPT

## 2022-05-23 PROCEDURE — 72125 CT NECK SPINE W/O DYE: CPT

## 2022-05-23 PROCEDURE — 93010 ELECTROCARDIOGRAM REPORT: CPT

## 2022-05-23 PROCEDURE — 71045 X-RAY EXAM CHEST 1 VIEW: CPT

## 2022-05-23 PROCEDURE — 0225U NFCT DS DNA&RNA 21 SARSCOV2: CPT

## 2022-05-23 PROCEDURE — 96374 THER/PROPH/DIAG INJ IV PUSH: CPT

## 2022-05-23 PROCEDURE — 86803 HEPATITIS C AB TEST: CPT

## 2022-05-23 PROCEDURE — 85027 COMPLETE CBC AUTOMATED: CPT

## 2022-05-23 PROCEDURE — 80053 COMPREHEN METABOLIC PANEL: CPT

## 2022-05-23 PROCEDURE — 36415 COLL VENOUS BLD VENIPUNCTURE: CPT

## 2022-05-23 PROCEDURE — 83690 ASSAY OF LIPASE: CPT

## 2022-05-23 PROCEDURE — 83735 ASSAY OF MAGNESIUM: CPT

## 2022-05-23 PROCEDURE — 80048 BASIC METABOLIC PNL TOTAL CA: CPT

## 2022-05-23 PROCEDURE — 81001 URINALYSIS AUTO W/SCOPE: CPT

## 2022-05-23 PROCEDURE — 82553 CREATINE MB FRACTION: CPT

## 2022-05-23 PROCEDURE — 82550 ASSAY OF CK (CPK): CPT

## 2022-05-23 PROCEDURE — 84484 ASSAY OF TROPONIN QUANT: CPT

## 2022-05-23 PROCEDURE — 93005 ELECTROCARDIOGRAM TRACING: CPT

## 2022-05-23 PROCEDURE — 72131 CT LUMBAR SPINE W/O DYE: CPT

## 2022-05-23 PROCEDURE — 72128 CT CHEST SPINE W/O DYE: CPT

## 2022-05-23 PROCEDURE — 99152 MOD SED SAME PHYS/QHP 5/>YRS: CPT

## 2022-05-23 PROCEDURE — 93306 TTE W/DOPPLER COMPLETE: CPT

## 2022-05-23 PROCEDURE — 92941 PRQ TRLML REVSC TOT OCCL AMI: CPT | Mod: LD

## 2022-05-23 PROCEDURE — 85025 COMPLETE CBC W/AUTO DIFF WBC: CPT

## 2022-05-23 PROCEDURE — 87086 URINE CULTURE/COLONY COUNT: CPT

## 2022-05-23 PROCEDURE — 93454 CORONARY ARTERY ANGIO S&I: CPT | Mod: 26,59

## 2022-05-23 PROCEDURE — 85379 FIBRIN DEGRADATION QUANT: CPT

## 2022-05-23 PROCEDURE — 80307 DRUG TEST PRSMV CHEM ANLYZR: CPT

## 2022-05-23 PROCEDURE — 85610 PROTHROMBIN TIME: CPT

## 2022-05-23 PROCEDURE — 97161 PT EVAL LOW COMPLEX 20 MIN: CPT

## 2022-05-23 PROCEDURE — 85730 THROMBOPLASTIN TIME PARTIAL: CPT

## 2022-05-23 PROCEDURE — 87186 SC STD MICRODIL/AGAR DIL: CPT

## 2022-05-23 RX ORDER — DIAZEPAM 5 MG
1 TABLET ORAL
Qty: 0 | Refills: 0 | DISCHARGE

## 2022-05-23 RX ORDER — LISINOPRIL 2.5 MG/1
2.5 TABLET ORAL DAILY
Refills: 0 | Status: DISCONTINUED | OUTPATIENT
Start: 2022-05-23 | End: 2022-05-24

## 2022-05-23 RX ORDER — ATORVASTATIN CALCIUM 80 MG/1
40 TABLET, FILM COATED ORAL AT BEDTIME
Refills: 0 | Status: DISCONTINUED | OUTPATIENT
Start: 2022-05-23 | End: 2022-05-24

## 2022-05-23 RX ORDER — ZIPRASIDONE HYDROCHLORIDE 20 MG/1
20 CAPSULE ORAL
Refills: 0 | Status: DISCONTINUED | OUTPATIENT
Start: 2022-05-23 | End: 2022-05-23

## 2022-05-23 RX ORDER — ASPIRIN/CALCIUM CARB/MAGNESIUM 324 MG
81 TABLET ORAL DAILY
Refills: 0 | Status: DISCONTINUED | OUTPATIENT
Start: 2022-05-24 | End: 2022-05-24

## 2022-05-23 RX ORDER — ZIPRASIDONE HYDROCHLORIDE 20 MG/1
1 CAPSULE ORAL
Qty: 0 | Refills: 0 | DISCHARGE

## 2022-05-23 RX ORDER — METOPROLOL TARTRATE 50 MG
12.5 TABLET ORAL
Refills: 0 | Status: DISCONTINUED | OUTPATIENT
Start: 2022-05-23 | End: 2022-05-24

## 2022-05-23 RX ORDER — SERTRALINE 25 MG/1
1 TABLET, FILM COATED ORAL
Qty: 0 | Refills: 0 | DISCHARGE

## 2022-05-23 RX ORDER — CLOPIDOGREL BISULFATE 75 MG/1
75 TABLET, FILM COATED ORAL DAILY
Refills: 0 | Status: DISCONTINUED | OUTPATIENT
Start: 2022-05-24 | End: 2022-05-24

## 2022-05-23 RX ORDER — HEPARIN SODIUM 5000 [USP'U]/ML
5000 INJECTION INTRAVENOUS; SUBCUTANEOUS EVERY 12 HOURS
Refills: 0 | Status: DISCONTINUED | OUTPATIENT
Start: 2022-05-24 | End: 2022-05-24

## 2022-05-23 RX ADMIN — Medication 12.5 MILLIGRAM(S): at 19:03

## 2022-05-23 RX ADMIN — Medication 12.5 MILLIGRAM(S): at 05:46

## 2022-05-23 RX ADMIN — ONDANSETRON 4 MILLIGRAM(S): 8 TABLET, FILM COATED ORAL at 05:46

## 2022-05-23 RX ADMIN — ATORVASTATIN CALCIUM 40 MILLIGRAM(S): 80 TABLET, FILM COATED ORAL at 21:12

## 2022-05-23 RX ADMIN — HEPARIN SODIUM 650 UNIT(S)/HR: 5000 INJECTION INTRAVENOUS; SUBCUTANEOUS at 08:26

## 2022-05-23 RX ADMIN — ONDANSETRON 4 MILLIGRAM(S): 8 TABLET, FILM COATED ORAL at 12:31

## 2022-05-23 RX ADMIN — Medication 81 MILLIGRAM(S): at 12:31

## 2022-05-23 RX ADMIN — HEPARIN SODIUM 650 UNIT(S)/HR: 5000 INJECTION INTRAVENOUS; SUBCUTANEOUS at 07:30

## 2022-05-23 NOTE — PROGRESS NOTE ADULT - PROBLEM SELECTOR PLAN 3
Complaining of mild abdominal pain   Mild distension of bladder on imaging  Refusing bladder scan and coley  UA+   Started on ceftriaxone Complaining of mild abdominal pain   Mild distension of bladder on imaging  Refusing bladder scan and coley  UA+   completed ceftriaxone

## 2022-05-23 NOTE — TRANSFER ACCEPTANCE NOTE - ASSESSMENT
The patient was transferred to [facility] today for cardiac catheterization. Consent is obtained from the patient and is in the patient's chart. Patient's son was also called, as per patient's request and the procedure was explained to him as well.

## 2022-05-23 NOTE — PROGRESS NOTE ADULT - ASSESSMENT
77y/o F with anxiety, depression presents to the ED with the complaint of chest pain for the past week. Admitted for ACS r/o 
75y/o F with anxiety, depression presents to the ED with the complaint of chest pain for the past week. Admitted for ACS r/o 
77y/o F with anxiety, depression presents to the ED with the complaint of chest pain for the past week. Admitted for ACS r/o 
75y/o F with anxiety, depression presents to the ED with the complaint of chest pain for the past week. Admitted for ACS r/o 
77y/o F with anxiety, depression presents to the ED with the complaint of chest pain for the past week. Admitted for ACS r/o 
77y/o F with anxiety, depression presents to the ED with the complaint of chest pain for the past week. Admitted for ACS r/o

## 2022-05-23 NOTE — TRANSFER ACCEPTANCE NOTE - NSICDXPASTSURGICALHX_GEN_ALL_CORE_FT
PAST SURGICAL HISTORY:  H/O abdominal hysterectomy     H/O eye surgery      PAST SURGICAL HISTORY:  H/O abdominal hysterectomy     H/O eye surgery     H/O shoulder surgery b/l

## 2022-05-23 NOTE — ACUTE INTERFACILITY TRANSFER NOTE - HOSPITAL COURSE
77y/o F with anxiety, depression presents to the ED with the complaint of chest pain for the past week. Patient states she was lifting a heavy box 2 weeks ago at home after which she started having back pain. For that her son got her a back brace that she put on very tight and has had it on day and night for 2 weeks. Last week she started developing bilateral intermittent chest pain, not related to exercise or exertion. Since the past couple of days she has also been having mild left lower quadrant abdominal pain. She uses 4 pillows at night because of her back but denies orthopnea, dyspnea, PND, lower extremity swelling, palpitations. Has intermittent soft bowel movements but denies constipation, fever, chills, vomiting, melena, hematochezia, or any other complaints. Endorses back pain but denies numbness, weakness, paresthesias, incontinence.     Patient was subsequently admitted for NSTEMI. She was monitored on telemetry. Echocardiogram showed normal EF 55-60%, GIDD. Cardiac enzymes Troponin was elevated and trended-up. She was started on Heparin drip. Troponin eventually trended down. She was started on Metoprolol also for HTN. She also complains of back pain. CXR and CT Spine showed thoracolumbar scoliosis with midthoracic compression deformities. Tylenol was also given for pain control. Orthopedic Surgery was consulted and recommended conservative management (braces) and follow-up as outpatient with Dr. Alas. Urinalysis also tested positive for UTI. She completed 3-day course of IV antibiotics Rocephin. She tested positive for non-COVID-19 Coronavirus. As per Infectious Disease (Dr. Morrow) no need for isolation or precautions. Supportive care was provided and symptoms such as nasal congestion and rhinorrhea improved. She has history of Anxiety and Depression. She used to take Sertraline and Diazepam but claims to have stopped those medications a long time ago. She is currently not on any medications for her anxiety and depression.     Patient was subsequently discharged and [facility] for Cardiac Catheterization. Receiving Cardiologist is Dr. Kathy Santa.

## 2022-05-23 NOTE — PROGRESS NOTE ADULT - PROBLEM SELECTOR PLAN 4
+coronavirus (not COVID-19)  CXR - neg  self-limited  supportive care  no need for isolatiom +coronavirus (not COVID-19)  CXR - neg  self-limited  supportive care  no need for isolation

## 2022-05-23 NOTE — PROGRESS NOTE ADULT - SUBJECTIVE AND OBJECTIVE BOX
PGY-1 Progress Note discussed with attending    PAGER #: [[phone]] TILL 5:00 PM  PLEASE CONTACT ON CALL TEAM:  - On Call Team (Please refer to Blake) FROM 5:00 PM - 8:30PM  - Nightfloat Team FROM 8:30 -7:30 AM    CHIEF COMPLAINT & BRIEF HOSPITAL COURSE:    75y/o F with anxiety, depression presents to the ED with the complaint of chest pain for the past week. Patient states she was lifting a heavy box 2 weeks ago at home after which she started having back pain. For that her son got her a back brace that she put on very tight and has had it on day and night for 2 weeks. Last week she started developing bilateral intermittent chest pain, not related to exercise or exertion. Since the past couple of days she has also been having mild left lower quadrant abdominal pain. She uses 4 pillows at night because of her back but denies orthopnea, dyspnea, PND, lower extremity swelling, palpitations. Has intermittent soft bowel movements but denies constipation, fever, chills, vomiting, melena, hematochezia, or any other complaints. Endorses back pain but denies numbness, weakness, paresthesias, incontinence.     INTERVAL HPI/OVERNIGHT EVENTS:       REVIEW OF SYSTEMS:  CONSTITUTIONAL: No fever, weight loss, or fatigue  RESPIRATORY: No cough, wheezing, chills or hemoptysis; No shortness of breath  CARDIOVASCULAR: No chest pain, palpitations, dizziness, or leg swelling  GASTROINTESTINAL: No abdominal pain. No nausea, vomiting, or hematemesis; No diarrhea or constipation. No melena or hematochezia.  GENITOURINARY: No dysuria or hematuria, urinary frequency  NEUROLOGICAL: No headaches, memory loss, loss of strength, numbness, or tremors  SKIN: No itching, burning, rashes, or lesions     MEDICATIONS  (STANDING):  aspirin enteric coated 81 milliGRAM(s) Oral daily  atorvastatin 40 milliGRAM(s) Oral at bedtime  heparin  Infusion.  Unit(s)/Hr (6.5 mL/Hr) IV Continuous <Continuous>  metoprolol tartrate 12.5 milliGRAM(s) Oral every 12 hours  ondansetron Injectable 4 milliGRAM(s) IV Push every 6 hours  senna 2 Tablet(s) Oral at bedtime    MEDICATIONS  (PRN):  acetaminophen     Tablet .. 650 milliGRAM(s) Oral every 6 hours PRN Mild Pain (1 - 3)  heparin   Injectable 3200 Unit(s) IV Push every 6 hours PRN For aPTT less than 40      Vital Signs Last 24 Hrs  T(C): 36.4 (23 May 2022 07:53), Max: 36.7 (22 May 2022 11:11)  T(F): 97.5 (23 May 2022 07:53), Max: 98.1 (22 May 2022 15:52)  HR: 68 (23 May 2022 07:53) (68 - 88)  BP: 119/55 (23 May 2022 07:53) (96/62 - 121/75)  BP(mean): --  RR: 18 (23 May 2022 07:53) (17 - 19)  SpO2: 95% (23 May 2022 07:53) (94% - 96%)    PHYSICAL EXAMINATION:  GENERAL: NAD, well built  HEAD:  Atraumatic, Normocephalic  EYES:  conjunctiva and sclera clear  NECK: Supple, No JVD, Normal thyroid  CHEST/LUNG: Clear to auscultation. Clear to percussion bilaterally; No rales, rhonchi, wheezing, or rubs  HEART: Regular rate and rhythm; No murmurs, rubs, or gallops  ABDOMEN: Soft, Nontender, Nondistended; Bowel sounds present, no pain or masses on palpation  NERVOUS SYSTEM:  Alert & Oriented X3  : voiding well  EXTREMITIES:  2+ Peripheral Pulses, No clubbing, cyanosis, or edema  SKIN: warm dry                          12.8   3.36  )-----------( 225      ( 23 May 2022 07:12 )             39.8     05-23    143  |  107  |  7   ----------------------------<  92  3.8   |  31  |  0.46<L>    Ca    9.0      23 May 2022 07:12  Phos  3.3     05-23  Mg     2.2     05-23    TPro  6.2  /  Alb  2.6<L>  /  TBili  0.2  /  DBili  x   /  AST  18  /  ALT  15  /  AlkPhos  101  05-23    LIVER FUNCTIONS - ( 23 May 2022 07:12 )  Alb: 2.6 g/dL / Pro: 6.2 g/dL / ALK PHOS: 101 U/L / ALT: 15 U/L DA / AST: 18 U/L / GGT: x               PTT - ( 23 May 2022 07:12 )  PTT:62.7 sec    I&O's Summary    22 May 2022 07:01  -  23 May 2022 07:00  --------------------------------------------------------  IN: 200 mL / OUT: 975 mL / NET: -775 mL          Culture - Urine (collected 20 May 2022 10:27)  Source: Clean Catch Clean Catch (Midstream)  Final Report (22 May 2022 12:41):    10,000 - 49,000 CFU/mL Escherichia coli    10,000 - 49,000 CFU/mL Coag Negative Staphylococcus "Susceptibilities not    performed"  Organism: Escherichia coli (22 May 2022 12:41)  Organism: Escherichia coli (22 May 2022 12:41)        CAPILLARY BLOOD GLUCOSE      RADIOLOGY & ADDITIONAL TESTS:                   PGY-1 Progress Note discussed with attending    PAGER #: [[phone]] TILL 5:00 PM  PLEASE CONTACT ON CALL TEAM:  - On Call Team (Please refer to Blake) FROM 5:00 PM - 8:30PM  - Nightfloat Team FROM 8:30 -7:30 AM    CHIEF COMPLAINT & BRIEF HOSPITAL COURSE:    77y/o F with anxiety, depression presents to the ED with the complaint of chest pain for the past week. Patient states she was lifting a heavy box 2 weeks ago at home after which she started having back pain. For that her son got her a back brace that she put on very tight and has had it on day and night for 2 weeks. Last week she started developing bilateral intermittent chest pain, not related to exercise or exertion. Since the past couple of days she has also been having mild left lower quadrant abdominal pain. She uses 4 pillows at night because of her back but denies orthopnea, dyspnea, PND, lower extremity swelling, palpitations. Has intermittent soft bowel movements but denies constipation, fever, chills, vomiting, melena, hematochezia, or any other complaints. Endorses back pain but denies numbness, weakness, paresthesias, incontinence.     INTERVAL HPI/OVERNIGHT EVENTS:     Patient was examined at bedside, AAOx3, stable, NAD. Still complains of occ. chest pain and back pain. For transfer to [facility] today for cardiac catheterization.    REVIEW OF SYSTEMS:  CONSTITUTIONAL: No fever, weight loss, or fatigue  RESPIRATORY: No cough, wheezing, chills or hemoptysis; No shortness of breath  CARDIOVASCULAR: (+) chest pain, No palpitations, dizziness, or leg swelling  GASTROINTESTINAL: No abdominal pain. No nausea, vomiting, or hematemesis; No diarrhea or constipation. No melena or hematochezia.  GENITOURINARY: No dysuria or hematuria, urinary frequency  NEUROLOGICAL: No headaches, memory loss, loss of strength, numbness, or tremors  MUSCULOSKELETAL: (+) back pain  SKIN: No itching, burning, rashes, or lesions     MEDICATIONS  (STANDING):  aspirin enteric coated 81 milliGRAM(s) Oral daily  atorvastatin 40 milliGRAM(s) Oral at bedtime  heparin  Infusion.  Unit(s)/Hr (6.5 mL/Hr) IV Continuous <Continuous>  metoprolol tartrate 12.5 milliGRAM(s) Oral every 12 hours  ondansetron Injectable 4 milliGRAM(s) IV Push every 6 hours  senna 2 Tablet(s) Oral at bedtime    MEDICATIONS  (PRN):  acetaminophen     Tablet .. 650 milliGRAM(s) Oral every 6 hours PRN Mild Pain (1 - 3)  heparin   Injectable 3200 Unit(s) IV Push every 6 hours PRN For aPTT less than 40      Vital Signs Last 24 Hrs  T(C): 36.4 (23 May 2022 07:53), Max: 36.7 (22 May 2022 11:11)  T(F): 97.5 (23 May 2022 07:53), Max: 98.1 (22 May 2022 15:52)  HR: 68 (23 May 2022 07:53) (68 - 88)  BP: 119/55 (23 May 2022 07:53) (96/62 - 121/75)  BP(mean): --  RR: 18 (23 May 2022 07:53) (17 - 19)  SpO2: 95% (23 May 2022 07:53) (94% - 96%)    PHYSICAL EXAMINATION:  GENERAL: NAD  HEAD:  Atraumatic, Normocephalic  EYES:  conjunctiva and sclera clear  NECK: Supple, No JVD, Normal thyroid  CHEST/LUNG: Clear to auscultation. Clear to percussion bilaterally; No rales, rhonchi, wheezing, or rubs  HEART: Regular rate and rhythm; No murmurs, rubs, or gallops  ABDOMEN: Soft, Nontender, Nondistended; Bowel sounds present, no pain or masses on palpation  NERVOUS SYSTEM:  Alert & Oriented X3  : voiding well  EXTREMITIES:  2+ Peripheral Pulses, No clubbing, cyanosis, or edema  SKIN: warm dry                          12.8   3.36  )-----------( 225      ( 23 May 2022 07:12 )             39.8     05-23    143  |  107  |  7   ----------------------------<  92  3.8   |  31  |  0.46<L>    Ca    9.0      23 May 2022 07:12  Phos  3.3     05-23  Mg     2.2     05-23    TPro  6.2  /  Alb  2.6<L>  /  TBili  0.2  /  DBili  x   /  AST  18  /  ALT  15  /  AlkPhos  101  05-23    LIVER FUNCTIONS - ( 23 May 2022 07:12 )  Alb: 2.6 g/dL / Pro: 6.2 g/dL / ALK PHOS: 101 U/L / ALT: 15 U/L DA / AST: 18 U/L / GGT: x               PTT - ( 23 May 2022 07:12 )  PTT:62.7 sec    I&O's Summary    22 May 2022 07:01  -  23 May 2022 07:00  --------------------------------------------------------  IN: 200 mL / OUT: 975 mL / NET: -775 mL          Culture - Urine (collected 20 May 2022 10:27)  Source: Clean Catch Clean Catch (Midstream)  Final Report (22 May 2022 12:41):    10,000 - 49,000 CFU/mL Escherichia coli    10,000 - 49,000 CFU/mL Coag Negative Staphylococcus "Susceptibilities not    performed"  Organism: Escherichia coli (22 May 2022 12:41)  Organism: Escherichia coli (22 May 2022 12:41)        CAPILLARY BLOOD GLUCOSE      RADIOLOGY & ADDITIONAL TESTS:

## 2022-05-23 NOTE — TRANSFER ACCEPTANCE NOTE - HISTORY OF PRESENT ILLNESS
76 y.o. female with PMH of Anxiety, Depression was admitted to [facility] on 5/19/22 c/o chest pain for the past week. Patient states she was lifting a heavy box 2 weeks ago at home after which she started having back pain. For that her son got her a back brace that she put on very tight and has had it on day and night for 2 weeks. Last week she started developing bilateral intermittent chest pain, not related to exercise or exertion. Since the past couple of days she has also been having mild left lower quadrant abdominal pain. She uses 4 pillows at night because of her back but denies orthopnea, dyspnea, PND, lower extremity swelling, palpitations. Has intermittent soft bowel movements but denies constipation, fever, chills, vomiting, melena, hematochezia, or any other complaints. Endorses back pain but denies numbness, weakness, paresthesias, incontinence.     Patient was subsequently admitted for NSTEMI. She was monitored on telemetry. Echocardiogram showed normal EF 55-60%, GIDD. Cardiac enzymes Troponin was elevated and trended-up. She was started on Heparin drip. Troponin eventually trended down. She was started on Metoprolol also for HTN. She also complains of back pain. CXR and CT Spine showed thoracolumbar scoliosis with midthoracic compression deformities. Tylenol was also given for pain control. Orthopedic Surgery was consulted and recommended conservative management (braces) and follow-up as outpatient with Dr. Alas. Urinalysis also tested positive for UTI. She completed 3-day course of IV antibiotics Rocephin. She tested positive for non-COVID-19 Coronavirus. As per Infectious Disease (Dr. Morrow) no need for isolation or precautions. Supportive care was provided and symptoms such as nasal congestion and rhinorrhea improved. She has history of Anxiety and Depression. She used to take Sertraline and Diazepam but claims to have stopped those medications a long time ago. She is currently not on any medications for her anxiety and depression.     The patient was transferred to [facility] today for cardiac catheterization.

## 2022-05-23 NOTE — PROGRESS NOTE ADULT - PROBLEM SELECTOR PLAN 2
Hx of scoliosis   Presents with back pain   C, T, L spine CT to r/o fracture vs disk herniation - dexttroscoliosis, compression deformity  CXR: Chronic thoracolumbar scoliosis along with midthoracic compression fractures of indeterminate age  Tylenol for pain   PT consult - home PT  Fall precautions   Ortho consulted Hx of scoliosis   Presents with back pain   C, T, L spine CT to r/o fracture vs disk herniation - dextroscoliosis, compression deformity  CXR: Chronic thoracolumbar scoliosis along with midthoracic compression fractures of indeterminate age  Tylenol for pain   PT consult - home PT  Fall precautions   Ortho consulted  outpatient follow-up

## 2022-05-23 NOTE — PROGRESS NOTE ADULT - PROBLEM SELECTOR PLAN 5
No hx of HTN  /101  Will repeat BP and give labetalol If persistently elevated  Started on metoprolol for ACS No hx of HTN  /101  Will repeat BP and give labetalol If persistently elevated  on metoprolol for ACS

## 2022-05-23 NOTE — PROGRESS NOTE ADULT - PROBLEM SELECTOR PLAN 7
IMPROVE VTE Individual Risk Assessment  RISK                                                                Points  [  ] Previous VTE                                                  3  [  ] Thrombophilia                                               2  [  ] Lower limb paralysis                                      2        (unable to hold up >15 seconds)    [  ] Current Cancer                                              2         (within 6 months)  [x  ] Immobilization > 24 hrs                                1  [  ] ICU/CCU stay > 24 hours                              1  [x  ] Age > 60                                                      1  IMPROVE VTE Score _________2, -- for DVT proph  scd boots till CT angio results IMPROVE VTE Individual Risk Assessment  RISK                                                                Points  [  ] Previous VTE                                                  3  [  ] Thrombophilia                                               2  [  ] Lower limb paralysis                                      2        (unable to hold up >15 seconds)    [  ] Current Cancer                                              2         (within 6 months)  [x  ] Immobilization > 24 hrs                                1  [  ] ICU/CCU stay > 24 hours                              1  [x  ] Age > 60                                                      1  IMPROVE VTE Score _________2, -- for DVT proph  continue heparin drip

## 2022-05-23 NOTE — PROGRESS NOTE ADULT - PROBLEM SELECTOR PLAN 1
ACS vs aortic dissection vs MSK   Presented with chest and back pain (not acute)  T1: 163, T2 726  EKG: NSR  Follow T3  CT angio aorta negative for dissection   CKMB and dimer - mildly elevated  Started on heparin drip, aspirin, atorvastatin and metoprolol   Follow Echo  Dr. Casper consulted ACS vs aortic dissection vs MSK   Presented with chest and back pain (not acute)  Trop elevated but trended down  EKG: NSR  CT angio aorta negative for dissection   CKMB and dimer - mildly elevated  on heparin drip, aspirin, atorvastatin and metoprolol   Echo - GIDD, EF 55-60%  Dr. Casper consulted  for transfer to [facility] today for cardiac cath

## 2022-05-23 NOTE — CHART NOTE - NSCHARTNOTEFT_GEN_A_CORE
STEFANIE MERCADO 76y Female s/p cath radial site check. Dressing is clear/dry/intact. Site is without hematoma or bleeding.   Patient denies pain, numbness, tingling, CP, SOB. VSS. Will continue to monitor.       Vital Signs Last 24 Hrs  T(C): 36.9 (23 May 2022 19:40), Max: 36.9 (23 May 2022 19:40)  T(F): 98.4 (23 May 2022 19:40), Max: 98.4 (23 May 2022 19:40)  HR: 76 (23 May 2022 19:40) (68 - 80)  BP: 120/73 (23 May 2022 19:40) (119/55 - 127/67)  RR: 18 (23 May 2022 19:40) (17 - 18)  SpO2: 100% (23 May 2022 19:40) (95% - 100%)  Pulses palpable, cap refill <2 sec.     Mariel Duckworth PA-C  pager [phone]

## 2022-05-23 NOTE — PATIENT PROFILE ADULT - FALL HARM RISK - HARM RISK INTERVENTIONS

## 2022-05-23 NOTE — ACUTE INTERFACILITY TRANSFER NOTE - PLAN OF CARE
Hx of scoliosis   Presents with back pain   C, T, L spine CT to r/o fracture vs disk herniation - dextroscoliosis, compression deformity  CXR: Chronic thoracolumbar scoliosis along with midthoracic compression fractures of indeterminate age  Tylenol for pain   PT consult - home PT  Fall precautions   Ortho consulted  outpatient follow-up Complaining of mild abdominal pain   Mild distension of bladder on imaging  Refusing bladder scan and coley  UA+   completed ceftriaxone. +coronavirus (not COVID-19)  CXR - neg  self-limited  supportive care  no need for isolation No hx of HTN  /101  Will repeat BP and give labetalol If persistently elevated  on metoprolol for ACS. not on any medications at home  States she does not have a pharmacy  Utox+ BZD. ACS vs aortic dissection vs MSK   Presented with chest and back pain (not acute)  Trop elevated but trended down  EKG: NSR  CT angio aorta negative for dissection   CKMB and dimer - mildly elevated  on heparin drip, aspirin, atorvastatin and metoprolol   Echo - GIDD, EF 55-60%  Dr. Casper consulted  for transfer to [facility] today for cardiac cath.

## 2022-05-24 ENCOUNTER — TRANSCRIPTION ENCOUNTER (OUTPATIENT)
Age: 77
End: 2022-05-24

## 2022-05-24 VITALS
SYSTOLIC BLOOD PRESSURE: 110 MMHG | HEART RATE: 74 BPM | OXYGEN SATURATION: 100 % | RESPIRATION RATE: 20 BRPM | DIASTOLIC BLOOD PRESSURE: 70 MMHG | TEMPERATURE: 99 F

## 2022-05-24 RX ORDER — ASPIRIN/CALCIUM CARB/MAGNESIUM 324 MG
1 TABLET ORAL
Qty: 30 | Refills: 0
Start: 2022-05-24 | End: 2022-06-22

## 2022-05-24 RX ORDER — CLOPIDOGREL BISULFATE 75 MG/1
1 TABLET, FILM COATED ORAL
Qty: 30 | Refills: 2
Start: 2022-05-24 | End: 2022-08-21

## 2022-05-24 RX ORDER — ATORVASTATIN CALCIUM 80 MG/1
1 TABLET, FILM COATED ORAL
Qty: 30 | Refills: 0
Start: 2022-05-24 | End: 2022-06-22

## 2022-05-24 RX ORDER — LISINOPRIL 2.5 MG/1
1 TABLET ORAL
Qty: 30 | Refills: 0
Start: 2022-05-24 | End: 2022-06-22

## 2022-05-24 RX ORDER — METOPROLOL TARTRATE 50 MG
0.5 TABLET ORAL
Qty: 30 | Refills: 0
Start: 2022-05-24 | End: 2022-06-22

## 2022-05-24 RX ADMIN — HEPARIN SODIUM 5000 UNIT(S): 5000 INJECTION INTRAVENOUS; SUBCUTANEOUS at 17:22

## 2022-05-24 RX ADMIN — Medication 12.5 MILLIGRAM(S): at 08:08

## 2022-05-24 RX ADMIN — LISINOPRIL 2.5 MILLIGRAM(S): 2.5 TABLET ORAL at 08:09

## 2022-05-24 RX ADMIN — CLOPIDOGREL BISULFATE 75 MILLIGRAM(S): 75 TABLET, FILM COATED ORAL at 12:10

## 2022-05-24 RX ADMIN — Medication 12.5 MILLIGRAM(S): at 17:19

## 2022-05-24 RX ADMIN — HEPARIN SODIUM 5000 UNIT(S): 5000 INJECTION INTRAVENOUS; SUBCUTANEOUS at 05:17

## 2022-05-24 RX ADMIN — Medication 81 MILLIGRAM(S): at 12:09

## 2022-05-24 NOTE — DISCHARGE NOTE NURSING/CASE MANAGEMENT/SOCIAL WORK - NSDCPEFALRISK_GEN_ALL_CORE
For information on Fall & Injury Prevention, visit: [email or web address] OR  [email or web address] OR  https://www.cdc.gov/steadi/patient.html

## 2022-05-24 NOTE — PROGRESS NOTE ADULT - PROBLEM SELECTOR PLAN 6
not on any medications at home  States she does not have a pharmacy  Utox+ BZD
not on any medications at home  States she does not have a pharmacy  Utox+ BZD

## 2022-05-24 NOTE — PROGRESS NOTE ADULT - PROBLEM SELECTOR PLAN 7
IMPROVE VTE Individual Risk Assessment  RISK                                                                Points  [  ] Previous VTE                                                  3  [  ] Thrombophilia                                               2  [  ] Lower limb paralysis                                      2        (unable to hold up >15 seconds)    [  ] Current Cancer                                              2         (within 6 months)  [x  ] Immobilization > 24 hrs                                1  [  ] ICU/CCU stay > 24 hours                              1  [x  ] Age > 60                                                      1  IMPROVE VTE Score _________2, -- for DVT proph  continue heparin drip
IMPROVE VTE Individual Risk Assessment  RISK                                                                Points  [  ] Previous VTE                                                  3  [  ] Thrombophilia                                               2  [  ] Lower limb paralysis                                      2        (unable to hold up >15 seconds)    [  ] Current Cancer                                              2         (within 6 months)  [x  ] Immobilization > 24 hrs                                1  [  ] ICU/CCU stay > 24 hours                              1  [x  ] Age > 60                                                      1  IMPROVE VTE Score _________2, -- for DVT proph  continue heparin drip

## 2022-05-24 NOTE — DISCHARGE NOTE PROVIDER - HOSPITAL COURSE
77y/o F with anxiety, depression presents to the ED with the complaint of chest pain for the past week. Admitted for ACS r/o. Troponins elevated, CKMB and dimer - mildly elevated, transferred to [facility] for NSTEMI from [facility]. EKG shows NSR, pt was started on heparin gtt. CT angio aorta negative for dissection. Echo - nml LV function, EF 55-60%. Pt c/o mild abd pain, +UA, completed course of Ceftriaxone.   transfer to [facility] from [facility] for cardiac cath 5/23. s/p Cardiac cath LAD 95%=> stent x 1, ASA + Plavix, RRA access    Discussed case with Dr. Casper on 5/24, pt is cleared for discharge home today.

## 2022-05-24 NOTE — DISCHARGE NOTE NURSING/CASE MANAGEMENT/SOCIAL WORK - PATIENT PORTAL LINK FT
You can access the FollowMyHealth Patient Portal offered by [facility] by registering at the following website: [email or web address]. By joining [employer]’s FollowMyHealth portal, you will also be able to view your health information using other applications (apps) compatible with our system.

## 2022-05-24 NOTE — PROGRESS NOTE ADULT - TIME BILLING
- Review of records, telemetry, vital signs and daily labs.   - General and cardiovascular physical examination.  - Generation of cardiovascular treatment plan.  - Coordination of care.      Patient was seen and examined by me on 5/24/22,interim events noted,labs and radiology studies reviewed.  Liang Casper MD,FACC.  [address] [place], [place] [id number].  [phone]
- Review of records, telemetry, vital signs and daily labs.   - General and cardiovascular physical examination.  - Generation of cardiovascular treatment plan.  - Coordination of care.      Patient was seen and examined by me on 5/23/22,interim events noted,labs and radiology studies reviewed.  Liang Casper MD,FACC.  [address] [place], [place] [id number].  [phone]

## 2022-05-24 NOTE — DISCHARGE NOTE PROVIDER - NSDCCPCAREPLAN_GEN_ALL_CORE_FT
PRINCIPAL DISCHARGE DIAGNOSIS  Diagnosis: CAD (coronary artery disease)  Assessment and Plan of Treatment: You had mild heart attack due to blockage in your coronary artery. You had a stent placed in your coronary artery. It is very important that you continue aspirin and Plavix, do not stop unless instructed by your physician. Continue low salt, fat, cholesterol and carbohydrate diet. Follow up with cardiologist in 1-2 weeks please call to make an appointment.

## 2022-05-24 NOTE — PROGRESS NOTE ADULT - PROBLEM SELECTOR PLAN 2
Hx of scoliosis   Presents with back pain   C, T, L spine CT to r/o fracture vs disk herniation - dextroscoliosis, compression deformity  CXR: Chronic thoracolumbar scoliosis along with midthoracic compression fractures of indeterminate age  Tylenol for pain   PT consult - home PT  Fall precautions   Ortho consulted  outpatient follow-up
Hx of scoliosis   Presents with back pain   C, T, L spine CT to r/o fracture vs disk herniation - dextroscoliosis, compression deformity  CXR: Chronic thoracolumbar scoliosis along with midthoracic compression fractures of indeterminate age  Tylenol for pain   PT consult - home PT  Fall precautions   Ortho consulted  outpatient follow-up

## 2022-05-24 NOTE — DISCHARGE NOTE PROVIDER - NSDCFUADDINST_GEN_ALL_CORE_FT
-No strenuous activity x 3 weeks.  No heavy lifting > 5-10lbs x one week.  Monitor right wrist for bleeding, pain, swelling, discharge.  Notify MD if symptoms occur.  You may shower, no baths/swimming x one week.

## 2022-05-24 NOTE — PROGRESS NOTE ADULT - SUBJECTIVE AND OBJECTIVE BOX
PATIENT SEEN AND EXAMINED ON :-5/24/22  DATE OF SERVICE:   5/24/22          Interim events noted,Labs ,Radiological studies and Cardiology tests reviewed .   HOSPITAL COURSE: HPI:  76 y.o. female with PMH of Anxiety, Depression was admitted to [facility] on 5/19/22 c/o chest pain for the past week. Patient states she was lifting a heavy box 2 weeks ago at home after which she started having back pain. For that her son got her a back brace that she put on very tight and has had it on day and night for 2 weeks. Last week she started developing bilateral intermittent chest pain, not related to exercise or exertion. Since the past couple of days she has also been having mild left lower quadrant abdominal pain. She uses 4 pillows at night because of her back but denies orthopnea, dyspnea, PND, lower extremity swelling, palpitations. Has intermittent soft bowel movements but denies constipation, fever, chills, vomiting, melena, hematochezia, or any other complaints. Endorses back pain but denies numbness, weakness, paresthesias, incontinence.     Patient was subsequently admitted for NSTEMI. She was monitored on telemetry. Echocardiogram showed normal EF 55-60%, GIDD. Cardiac enzymes Troponin was elevated and trended-up. She was started on Heparin drip. Troponin eventually trended down. She was started on Metoprolol also for HTN. She also complains of back pain. CXR and CT Spine showed thoracolumbar scoliosis with midthoracic compression deformities. Tylenol was also given for pain control. Orthopedic Surgery was consulted and recommended conservative management (braces) and follow-up as outpatient with Dr. Alas. Urinalysis also tested positive for UTI. She completed 3-day course of IV antibiotics Rocephin. She tested positive for non-COVID-19 Coronavirus. As per Infectious Disease (Dr. Morrow) no need for isolation or precautions. Supportive care was provided and symptoms such as nasal congestion and rhinorrhea improved. She has history of Anxiety and Depression. She used to take Sertraline and Diazepam but claims to have stopped those medications a long time ago. She is currently not on any medications for her anxiety and depression.     The patient was transferred to [facility] today for cardiac catheterization.    (23 May 2022 14:25)      INTERIM EVENTS:Patient seen at bedside ,interim events noted.      PMH -reviewed admission note, no change since admission  HEART FAILURE: Acute[ ]Chronic[ ] Systolic[ ] Diastolic[ ] Combined Systolic and Diastolic[ ]  CAD[ ] CABG[ ] PCI[ ]  DEVICES[ ] PPM[ ] ICD[ ] ILR[ ]  ATRIAL FIBRILLATION[ ] Paroxysmal[ ] Permanent[ ]  MYNOR[ ] CKD1[ ] CKD2[ ] CKD3[ ] CKD4[ ] ESRD[ ]  COPD[ ] HTN[ ]   DM[ ] Type1[ ] Type 2[ ]   CVA[ ] Paresis[ ]    AMBULATION: Assisted[ ] Cane/walker[ ] Independent[ ]    MEDICATIONS  (STANDING):  aspirin enteric coated 81 milliGRAM(s) Oral daily  atorvastatin 40 milliGRAM(s) Oral at bedtime  clopidogrel Tablet 75 milliGRAM(s) Oral daily  heparin   Injectable 5000 Unit(s) SubCutaneous every 12 hours  lisinopril 2.5 milliGRAM(s) Oral daily  metoprolol tartrate 12.5 milliGRAM(s) Oral two times a day    MEDICATIONS  (PRN):            REVIEW OF SYSTEMS:  Constitutional: [ ] fever, [ ]weight loss,  [ ]fatigue  Eyes: [ ] visual changes  Respiratory: [ ]shortness of breath;  [ ] cough, [ ]wheezing, [ ]chills, [ ]hemoptysis  Cardiovascular: [ ] chest pain, [ ]palpitations, [ ]dizziness,  [ ]leg swelling[ ]orthopnea[ ]PND  Gastrointestinal: [ ] abdominal pain, [ ]nausea, [ ]vomiting,  [ ]diarrhea [ ]Constipation [ ]Melena  Genitourinary: [ ] dysuria, [ ] hematuria [ ]Dupont  Neurologic: [ ] headaches [ ] tremors[ ]weakness [ ]Paralysis Right[ ] Left[ ]  Skin: [ ] itching, [ ]burning, [ ] rashes  Endocrine: [ ] heat or cold intolerance  Musculoskeletal: [ ] joint pain or swelling; [ ] muscle, back, or extremity pain  Psychiatric: [ ] depression, [ ]anxiety, [ ]mood swings, or [ ]difficulty sleeping  Hematologic: [ ] easy bruising, [ ] bleeding gums    [ ] All remaining systems negative except as per above.   [ ]Unable to obtain.  [x] No change in ROS since admission      Vital Signs Last 24 Hrs  T(C): 37.2 (24 May 2022 08:08), Max: 37.2 (24 May 2022 08:08)  T(F): 98.9 (24 May 2022 08:08), Max: 98.9 (24 May 2022 08:08)  HR: 74 (24 May 2022 08:08) (72 - 76)  BP: 110/70 (24 May 2022 08:08) (100/60 - 120/73)  BP(mean): --  RR: 20 (24 May 2022 08:08) (18 - 20)  SpO2: 100% (24 May 2022 08:08) (100% - 100%)  I&O's Summary      PHYSICAL EXAM:  General: No acute distress BMI-  HEENT: EOMI, PERRL  Neck: Supple, [ ] JVD  Lungs: Equal air entry bilaterally; [ ] rales [ ] wheezing [ ] rhonchi  Heart: Regular rate and rhythm; [x ] murmur   2/6 [ x] systolic [ ] diastolic [ ] radiation[ ] rubs [ ]  gallops  Abdomen: Nontender, bowel sounds present  Extremities: No clubbing, cyanosis, [ ] edema [ ]Pulses  equal and intact  Nervous system:  Alert & Oriented X3, no focal deficits  Psychiatric: Normal affect  Skin: No rashes or lesions    LABS:  05-23    143  |  107  |  7   ----------------------------<  92  3.8   |  31  |  0.46<L>    Ca    9.0      23 May 2022 07:12  Phos  3.3     05-23  Mg     2.2     05-23    TPro  6.2  /  Alb  2.6<L>  /  TBili  0.2  /  DBili  x   /  AST  18  /  ALT  15  /  AlkPhos  101  05-23    Creatinine Trend: 0.46<--, 0.56<--, 0.55<--, 0.43<--, 0.54<--                        12.8   3.36  )-----------( 225      ( 23 May 2022 07:12 )             39.8     PTT - ( 23 May 2022 07:12 )  PTT:62.7 sec          
PATIENT SEEN AND EXAMINED ON :- 5/23/22  DATE OF SERVICE: 5/23/22            Interim events noted,Labs ,Radiological studies and Cardiology tests reviewed .     HOSPITAL COURSE: HPI:  76 y.o. female with PMH of Anxiety, Depression was admitted to [facility] on 5/19/22 c/o chest pain for the past week. Patient states she was lifting a heavy box 2 weeks ago at home after which she started having back pain. For that her son got her a back brace that she put on very tight and has had it on day and night for 2 weeks. Last week she started developing bilateral intermittent chest pain, not related to exercise or exertion. Since the past couple of days she has also been having mild left lower quadrant abdominal pain. She uses 4 pillows at night because of her back but denies orthopnea, dyspnea, PND, lower extremity swelling, palpitations. Has intermittent soft bowel movements but denies constipation, fever, chills, vomiting, melena, hematochezia, or any other complaints. Endorses back pain but denies numbness, weakness, paresthesias, incontinence.     Patient was subsequently admitted for NSTEMI. She was monitored on telemetry. Echocardiogram showed normal EF 55-60%, GIDD. Cardiac enzymes Troponin was elevated and trended-up. She was started on Heparin drip. Troponin eventually trended down. She was started on Metoprolol also for HTN. She also complains of back pain. CXR and CT Spine showed thoracolumbar scoliosis with midthoracic compression deformities. Tylenol was also given for pain control. Orthopedic Surgery was consulted and recommended conservative management (braces) and follow-up as outpatient with Dr. Alas. Urinalysis also tested positive for UTI. She completed 3-day course of IV antibiotics Rocephin. She tested positive for non-COVID-19 Coronavirus. As per Infectious Disease (Dr. Morrow) no need for isolation or precautions. Supportive care was provided and symptoms such as nasal congestion and rhinorrhea improved. She has history of Anxiety and Depression. She used to take Sertraline and Diazepam but claims to have stopped those medications a long time ago. She is currently not on any medications for her anxiety and depression.     The patient was transferred to [facility] today for cardiac catheterization.    (23 May 2022 14:25)      INTERIM EVENTS:Patient seen at bedside ,interim events noted.      PMH -reviewed admission note, no change since admission  HEART FAILURE: Acute[ ]Chronic[ ] Systolic[ ] Diastolic[ ] Combined Systolic and Diastolic[ ]  CAD[ ] CABG[ ] PCI[ ]  DEVICES[ ] PPM[ ] ICD[ ] ILR[ ]  ATRIAL FIBRILLATION[ ] Paroxysmal[ ] Permanent[ ]  MYNOR[ ] CKD1[ ] CKD2[ ] CKD3[ ] CKD4[ ] ESRD[ ]  COPD[ ] HTN[ ]   DM[ ] Type1[ ] Type 2[ ]   CVA[ ] Paresis[ ]    AMBULATION: Assisted[ ] Cane/walker[ ] Independent[ ]    MEDICATIONS  (STANDING):  atorvastatin 40 milliGRAM(s) Oral at bedtime  lisinopril 2.5 milliGRAM(s) Oral daily  metoprolol tartrate 12.5 milliGRAM(s) Oral two times a day    MEDICATIONS  (PRN):            REVIEW OF SYSTEMS:  Constitutional: [ ] fever, [ ]weight loss,  [ ]fatigue  Eyes: [ ] visual changes  Respiratory: [ ]shortness of breath;  [ ] cough, [ ]wheezing, [ ]chills, [ ]hemoptysis  Cardiovascular: [ ] chest pain, [ ]palpitations, [ ]dizziness,  [ ]leg swelling[ ]orthopnea[ ]PND  Gastrointestinal: [ ] abdominal pain, [ ]nausea, [ ]vomiting,  [ ]diarrhea [ ]Constipation [ ]Melena  Genitourinary: [ ] dysuria, [ ] hematuria [ ]Dupont  Neurologic: [ ] headaches [ ] tremors[ ]weakness [ ]Paralysis Right[ ] Left[ ]  Skin: [ ] itching, [ ]burning, [ ] rashes  Endocrine: [ ] heat or cold intolerance  Musculoskeletal: [ ] joint pain or swelling; [ ] muscle, back, or extremity pain  Psychiatric: [ ] depression, [ ]anxiety, [ ]mood swings, or [ ]difficulty sleeping  Hematologic: [ ] easy bruising, [ ] bleeding gums    [ ] All remaining systems negative except as per above.   [ ]Unable to obtain.  [x] No change in ROS since admission      Vital Signs Last 24 Hrs  T(C): 36.3 (23 May 2022 11:27), Max: 36.5 (22 May 2022 23:52)  T(F): 97.4 (23 May 2022 11:27), Max: 97.7 (22 May 2022 23:52)  HR: 72 (23 May 2022 11:27) (68 - 88)  BP: 127/67 (23 May 2022 11:27) (106/49 - 127/67)  BP(mean): --  RR: 18 (23 May 2022 11:27) (17 - 18)  SpO2: 98% (23 May 2022 11:27) (94% - 98%)  I&O's Summary      PHYSICAL EXAM:  General: No acute distress BMI-  HEENT: EOMI, PERRL  Neck: Supple, [ ] JVD  Lungs: Equal air entry bilaterally; [ ] rales [ ] wheezing [ ] rhonchi  Heart: Regular rate and rhythm; [x ] murmur   2/6 [ x] systolic [ ] diastolic [ ] radiation[ ] rubs [ ]  gallops  Abdomen: Nontender, bowel sounds present  Extremities: No clubbing, cyanosis, [ ] edema [ ]Pulses  equal and intact  Nervous system:  Alert & Oriented X3, no focal deficits  Psychiatric: Normal affect  Skin: No rashes or lesions    LABS:  05-23    143  |  107  |  7   ----------------------------<  92  3.8   |  31  |  0.46<L>    Ca    9.0      23 May 2022 07:12  Phos  3.3     05-23  Mg     2.2     05-23    TPro  6.2  /  Alb  2.6<L>  /  TBili  0.2  /  DBili  x   /  AST  18  /  ALT  15  /  AlkPhos  101  05-23    Creatinine Trend: 0.46<--, 0.56<--, 0.55<--, 0.43<--, 0.54<--                        12.8   3.36  )-----------( 225      ( 23 May 2022 07:12 )             39.8     PTT - ( 23 May 2022 07:12 )  PTT:62.7 sec

## 2022-05-24 NOTE — PROGRESS NOTE ADULT - PROBLEM SELECTOR PLAN 5
No hx of HTN  /101  Will repeat BP and give labetalol If persistently elevated  on metoprolol for ACS
No hx of HTN  /101  Will repeat BP and give labetalol If persistently elevated  on metoprolol for ACS

## 2022-05-24 NOTE — PROGRESS NOTE ADULT - PROBLEM SELECTOR PLAN 4
+coronavirus (not COVID-19)  CXR - neg  self-limited  supportive care  no need for isolation
+coronavirus (not COVID-19)  CXR - neg  self-limited  supportive care  no need for isolation

## 2022-05-24 NOTE — DISCHARGE NOTE PROVIDER - NSDCFUADDAPPT_GEN_ALL_CORE_FT
Follow up with your primary care physician for further monitoring in 1-2 weeks. Please call to arrange appointment.     Please see cardiology Dr. Casper in 1-2 weeks, please call for an appointment

## 2022-05-24 NOTE — PROGRESS NOTE ADULT - ASSESSMENT
75y/o F with anxiety, depression presents to the ED with the complaint of chest pain for the past week. Admitted for ACS r/o 
77y/o F with anxiety, depression presents to the ED with the complaint of chest pain for the past week. Admitted for ACS r/o

## 2022-05-24 NOTE — PROGRESS NOTE ADULT - PROBLEM SELECTOR PLAN 1
ACS vs aortic dissection vs MSK   Presented with chest and back pain (not acute)  Trop elevated but trended down  EKG: NSR  CT angio aorta negative for dissection   CKMB and dimer - mildly elevated  on heparin drip, aspirin, atorvastatin and metoprolol   Echo - GIDD, EF 55-60%  Dr. Casper consulted  [facility] for cardiac cath 5/23
ACS vs aortic dissection vs MSK   Presented with chest and back pain (not acute)  Trop elevated but trended down  EKG: NSR  CT angio aorta negative for dissection   CKMB and dimer - mildly elevated  on heparin drip, aspirin, atorvastatin and metoprolol   Echo - GIDD, EF 55-60%  Dr. Casper consulted  transfer to [facility] from [facility] for cardiac cath 5/23

## 2022-05-24 NOTE — DISCHARGE NOTE PROVIDER - NSDCMRMEDTOKEN_GEN_ALL_CORE_FT
aspirin 81 mg oral delayed release tablet: 1 tab(s) orally once a day  atorvastatin 40 mg oral tablet: 1 tab(s) orally once a day (at bedtime)    please call aroldo with green  [phone]  clopidogrel 75 mg oral tablet: 1 tab(s) orally once a day      please call aroldo with green  [phone]  lisinopril 2.5 mg oral tablet: 1 tab(s) orally once a day    Aroldo, please call with price   [phone]  metoprolol tartrate 25 mg oral tablet: 0.5 tab(s) orally 2 times a day       please call aroldo with green  [phone]

## 2022-05-24 NOTE — DISCHARGE NOTE PROVIDER - CARE PROVIDER_API CALL
Liang Casper)  Cardiology  [address] [place] [address]  Phone: [phone]  Fax: [phone]  Follow Up Time:

## 2022-05-24 NOTE — PROGRESS NOTE ADULT - PROBLEM SELECTOR PLAN 3
Complaining of mild abdominal pain   Mild distension of bladder on imaging  Refusing bladder scan and coley  UA+   completed ceftriaxone
Complaining of mild abdominal pain   Mild distension of bladder on imaging  Refusing bladder scan and coley  UA+   completed ceftriaxone

## 2022-08-10 NOTE — ED PROVIDER NOTE - CONSTITUTIONAL, MLM
What Type Of Note Output Would You Prefer (Optional)?: Standard Output
How Severe Is Your Skin Lesion?: mild
Has Your Skin Lesion Been Treated?: not been treated
Is This A New Presentation, Or A Follow-Up?: Skin Lesion
- - -

## 2023-08-21 NOTE — ED ADULT TRIAGE NOTE - TEMPERATURE IN FAHRENHEIT (DEGREES F)
Problem: Chronic Conditions and Co-morbidities  Goal: Patient's chronic conditions and co-morbidity symptoms are monitored and maintained or improved  Outcome: Progressing     Problem: Discharge Planning  Goal: Discharge to home or other facility with appropriate resources  Outcome: Progressing     Problem: ABCDS Injury Assessment  Goal: Absence of physical injury  Outcome: Progressing     Problem: Safety - Adult  Goal: Free from fall injury  Outcome: Progressing 98.8

## 2023-10-07 ENCOUNTER — EMERGENCY (EMERGENCY)
Facility: HOSPITAL | Age: 78
LOS: 1 days | Discharge: HOME | End: 2023-10-07
Attending: EMERGENCY MEDICINE | Admitting: EMERGENCY MEDICINE
Payer: MEDICARE

## 2023-10-07 VITALS
SYSTOLIC BLOOD PRESSURE: 137 MMHG | HEART RATE: 80 BPM | TEMPERATURE: 98 F | OXYGEN SATURATION: 99 % | DIASTOLIC BLOOD PRESSURE: 82 MMHG | RESPIRATION RATE: 16 BRPM

## 2023-10-07 VITALS
OXYGEN SATURATION: 100 % | DIASTOLIC BLOOD PRESSURE: 85 MMHG | SYSTOLIC BLOOD PRESSURE: 130 MMHG | RESPIRATION RATE: 16 BRPM | HEART RATE: 113 BPM | TEMPERATURE: 99 F

## 2023-10-07 DIAGNOSIS — Z90.710 ACQUIRED ABSENCE OF BOTH CERVIX AND UTERUS: Chronic | ICD-10-CM

## 2023-10-07 DIAGNOSIS — Z98.890 OTHER SPECIFIED POSTPROCEDURAL STATES: Chronic | ICD-10-CM

## 2023-10-07 LAB
ALBUMIN SERPL ELPH-MCNC: 4.3 G/DL — SIGNIFICANT CHANGE UP (ref 3.3–5)
ALP SERPL-CCNC: 83 U/L — SIGNIFICANT CHANGE UP (ref 40–120)
ALT FLD-CCNC: 12 U/L — SIGNIFICANT CHANGE UP (ref 4–33)
ANION GAP SERPL CALC-SCNC: 10 MMOL/L — SIGNIFICANT CHANGE UP (ref 7–14)
AST SERPL-CCNC: 21 U/L — SIGNIFICANT CHANGE UP (ref 4–32)
BASE EXCESS BLDV CALC-SCNC: 3.5 MMOL/L — HIGH (ref -2–3)
BASOPHILS # BLD AUTO: 0.03 K/UL — SIGNIFICANT CHANGE UP (ref 0–0.2)
BASOPHILS NFR BLD AUTO: 0.4 % — SIGNIFICANT CHANGE UP (ref 0–2)
BILIRUB SERPL-MCNC: 0.2 MG/DL — SIGNIFICANT CHANGE UP (ref 0.2–1.2)
BLOOD GAS VENOUS COMPREHENSIVE RESULT: SIGNIFICANT CHANGE UP
BUN SERPL-MCNC: 16 MG/DL — SIGNIFICANT CHANGE UP (ref 7–23)
CALCIUM SERPL-MCNC: 9.8 MG/DL — SIGNIFICANT CHANGE UP (ref 8.4–10.5)
CHLORIDE BLDV-SCNC: 102 MMOL/L — SIGNIFICANT CHANGE UP (ref 96–108)
CHLORIDE SERPL-SCNC: 103 MMOL/L — SIGNIFICANT CHANGE UP (ref 98–107)
CO2 BLDV-SCNC: 32.9 MMOL/L — HIGH (ref 22–26)
CO2 SERPL-SCNC: 28 MMOL/L — SIGNIFICANT CHANGE UP (ref 22–31)
CREAT SERPL-MCNC: 0.52 MG/DL — SIGNIFICANT CHANGE UP (ref 0.5–1.3)
EGFR: 96 ML/MIN/1.73M2 — SIGNIFICANT CHANGE UP
EOSINOPHIL # BLD AUTO: 0.04 K/UL — SIGNIFICANT CHANGE UP (ref 0–0.5)
EOSINOPHIL NFR BLD AUTO: 0.5 % — SIGNIFICANT CHANGE UP (ref 0–6)
GAS PNL BLDV: 138 MMOL/L — SIGNIFICANT CHANGE UP (ref 136–145)
GAS PNL BLDV: SIGNIFICANT CHANGE UP
GLUCOSE BLDV-MCNC: 95 MG/DL — SIGNIFICANT CHANGE UP (ref 70–99)
GLUCOSE SERPL-MCNC: 95 MG/DL — SIGNIFICANT CHANGE UP (ref 70–99)
HCO3 BLDV-SCNC: 31 MMOL/L — HIGH (ref 22–29)
HCT VFR BLD CALC: 40.7 % — SIGNIFICANT CHANGE UP (ref 34.5–45)
HCT VFR BLDA CALC: 41 % — SIGNIFICANT CHANGE UP (ref 34.5–46.5)
HGB BLD CALC-MCNC: 13.8 G/DL — SIGNIFICANT CHANGE UP (ref 11.7–16.1)
HGB BLD-MCNC: 13.4 G/DL — SIGNIFICANT CHANGE UP (ref 11.5–15.5)
IANC: 4.72 K/UL — SIGNIFICANT CHANGE UP (ref 1.8–7.4)
IMM GRANULOCYTES NFR BLD AUTO: 0.4 % — SIGNIFICANT CHANGE UP (ref 0–0.9)
LACTATE BLDV-MCNC: 1 MMOL/L — SIGNIFICANT CHANGE UP (ref 0.5–2)
LIDOCAIN IGE QN: 36 U/L — SIGNIFICANT CHANGE UP (ref 7–60)
LYMPHOCYTES # BLD AUTO: 2.29 K/UL — SIGNIFICANT CHANGE UP (ref 1–3.3)
LYMPHOCYTES # BLD AUTO: 29.2 % — SIGNIFICANT CHANGE UP (ref 13–44)
MCHC RBC-ENTMCNC: 31.7 PG — SIGNIFICANT CHANGE UP (ref 27–34)
MCHC RBC-ENTMCNC: 32.9 GM/DL — SIGNIFICANT CHANGE UP (ref 32–36)
MCV RBC AUTO: 96.2 FL — SIGNIFICANT CHANGE UP (ref 80–100)
MONOCYTES # BLD AUTO: 0.74 K/UL — SIGNIFICANT CHANGE UP (ref 0–0.9)
MONOCYTES NFR BLD AUTO: 9.4 % — SIGNIFICANT CHANGE UP (ref 2–14)
NEUTROPHILS # BLD AUTO: 4.72 K/UL — SIGNIFICANT CHANGE UP (ref 1.8–7.4)
NEUTROPHILS NFR BLD AUTO: 60.1 % — SIGNIFICANT CHANGE UP (ref 43–77)
NRBC # BLD: 0 /100 WBCS — SIGNIFICANT CHANGE UP (ref 0–0)
NRBC # FLD: 0 K/UL — SIGNIFICANT CHANGE UP (ref 0–0)
PCO2 BLDV: 59 MMHG — HIGH (ref 39–52)
PH BLDV: 7.33 — SIGNIFICANT CHANGE UP (ref 7.32–7.43)
PLATELET # BLD AUTO: 219 K/UL — SIGNIFICANT CHANGE UP (ref 150–400)
PO2 BLDV: <20 MMHG — SIGNIFICANT CHANGE UP (ref 25–45)
POTASSIUM BLDV-SCNC: 3.7 MMOL/L — SIGNIFICANT CHANGE UP (ref 3.5–5.1)
POTASSIUM SERPL-MCNC: 3.9 MMOL/L — SIGNIFICANT CHANGE UP (ref 3.5–5.3)
POTASSIUM SERPL-SCNC: 3.9 MMOL/L — SIGNIFICANT CHANGE UP (ref 3.5–5.3)
PROT SERPL-MCNC: 7.6 G/DL — SIGNIFICANT CHANGE UP (ref 6–8.3)
RBC # BLD: 4.23 M/UL — SIGNIFICANT CHANGE UP (ref 3.8–5.2)
RBC # FLD: 13.6 % — SIGNIFICANT CHANGE UP (ref 10.3–14.5)
SAO2 % BLDV: 17.8 % — LOW (ref 67–88)
SODIUM SERPL-SCNC: 141 MMOL/L — SIGNIFICANT CHANGE UP (ref 135–145)
WBC # BLD: 7.85 K/UL — SIGNIFICANT CHANGE UP (ref 3.8–10.5)
WBC # FLD AUTO: 7.85 K/UL — SIGNIFICANT CHANGE UP (ref 3.8–10.5)

## 2023-10-07 PROCEDURE — 99285 EMERGENCY DEPT VISIT HI MDM: CPT | Mod: GC

## 2023-10-07 PROCEDURE — 74177 CT ABD & PELVIS W/CONTRAST: CPT | Mod: 26,MA

## 2023-10-07 RX ORDER — ACETAMINOPHEN 500 MG
1000 TABLET ORAL ONCE
Refills: 0 | Status: COMPLETED | OUTPATIENT
Start: 2023-10-07 | End: 2023-10-07

## 2023-10-07 NOTE — ED ADULT NURSE NOTE - OBJECTIVE STATEMENT
pt received to room #11, accompanied by Son. as per Pt she has a hernia that has been becoming larger over the last year, worse when standing. pt aox4. denies cp,sob, n/v/d. IV placed, labs drawn and sent. pt ambulated to the bathroom by PCA for urine sample. report given to primary Rn.

## 2023-10-07 NOTE — ED ADULT NURSE NOTE - NS ED NURSE RECORD ANOTHER VITAL SIGN
Yes Tolerates Diet Consistency Well  Tolerates Fluid Consistency Well  No Chewing/Swallowing Difficulties  (Per Patient)

## 2023-10-07 NOTE — ED PROVIDER NOTE - PROGRESS NOTE DETAILS
Pt stable, no abd pain or tenderness on repeat exam, agreeable to discharge. Discussed results of workup, importance of follow-up with general surgery for elective hernia repair, and return precautions with patient and family who verbalized understanding and agreement. Pt had opportunity to ask questions and address concerns, and results of workup including lab and imaging, and incidental findings, were reviewed and provided in DC paperwork. Patient is medically clear for DC at this time. -Lily Stoner, PGY-3

## 2023-10-07 NOTE — ED PROVIDER NOTE - OBJECTIVE STATEMENT
76 yo F past medical history of NSTEMI, CAD, HTN, and known abdominal hernia presents for abdominal pain associated with hernia and fear that it will burst. Patient states pain has been ongoing for the past few months and is worse when she is standing and moving. Denies fever, chills, nausea, vomiting, diarrhea, constipation, heatochezia, melena, hematuria, dysuria, or any other symptoms at this time. Glendy Godwin, DO PGY1

## 2023-10-07 NOTE — ED ADULT NURSE NOTE - NSFALLRISKINTERV_ED_ALL_ED

## 2023-10-07 NOTE — ED PROVIDER NOTE - ATTENDING CONTRIBUTION TO CARE
I performed a face-to-face evaluation of the patient and performed a history and physical examination. I agree with the history and physical examination. If this was a PA visit, I personally saw the patient with the PA and performed a substantive portion of the visit including all aspects of the medical decision making.    CAD (stents). Central hernia that wasn’t able to be repaired because, at the time it was identified, the heart procedure was a greater priority. Presents with increasing pain from ventral hernia. No signs of obstruction. Will CT scan to get a sense of the size of the hernia, and refer to general surgery.

## 2023-10-07 NOTE — ED PROVIDER NOTE - NSFOLLOWUPCLINICS_GEN_ALL_ED_FT
[facility]  General Surgery  [address] [place] [address] - [address] [place] [address]  Phone: [phone]  Fax:

## 2023-10-07 NOTE — ED PROVIDER NOTE - CLINICAL SUMMARY MEDICAL DECISION MAKING FREE TEXT BOX
Saleem: CAD (stents). Central hernia that wasn’t able to be repaired because, at the time it was identified, the heart procedure was a greater priority. Presents with increasing pain from ventral hernia. No signs of obstruction. Will CT scan to get a sense of the size of the hernia, and refer to general surgery.

## 2023-10-07 NOTE — ED PROVIDER NOTE - PATIENT PORTAL LINK FT
You can access the FollowMyHealth Patient Portal offered by [facility] by registering at the following website: [email or web address]. By joining [facility]’s FollowMyHealth portal, you will also be able to view your health information using other applications (apps) compatible with our system.

## 2023-10-07 NOTE — ED ADULT TRIAGE NOTE - CHIEF COMPLAINT QUOTE
c/o mid abdominal pain.  Diagnosed with abdominal hernia and has noticed bulge increasing int size since may 2023. Pt moving bowels daily. Phx NSTEMI, CAD, HTN

## 2023-10-07 NOTE — ED PROVIDER NOTE - NSFOLLOWUPINSTRUCTIONS_ED_ALL_ED_FT
You came to the emergency department for abdominal pain from a hernia. We performed a CT that was normal. Please follow up with a surgeon regarding your hernia.     Please take Tylenol and ibuprofen for the pain and continue your normal diet and routine.     Please return to the emergency department if you have new or worsening pain, nausea, vomiting, are unable to have bowel movements or otherwise feel unwell. You came to the emergency department for abdominal pain from a hernia. We performed a CT that was normal. Please follow up with a surgeon regarding your hernia.     Please take Tylenol and ibuprofen for the pain and continue your normal diet and routine.     Please return to the emergency department if you have new or worsening pain, nausea, vomiting, are unable to have bowel movements or otherwise feel unwell.    Please follow up with a general surgeon for further discussion regarding management of your VENTRAL HERNIA.

## 2023-10-07 NOTE — ED PROVIDER NOTE - NSICDXPASTSURGICALHX_GEN_ALL_CORE_FT
PAST SURGICAL HISTORY:  H/O abdominal hysterectomy     H/O eye surgery     H/O shoulder surgery b/l

## 2023-10-07 NOTE — ED ADULT NURSE REASSESSMENT NOTE - NS ED NURSE REASSESS COMMENT FT1
Received report from JENNA Contreras @2015. Pt is Awake, A&O x 3. No complaints of chest pain, headache, nausea, dizziness, vomiting, SOB, fever or chills. Pt has 20g iv placed to right AC with no redness or swelling noted. Awaiting further orders.

## 2023-10-07 NOTE — ED PROVIDER NOTE - PHYSICAL EXAMINATION
Glendy Godwin DO (PGY1)   Physical Exam:    Gen: NAD, AOx3, non-toxic appearing, able to ambulate without assistance  Lung: CTAB, no respiratory distress, no wheezes/rhonchi/rales B/L  CV: RRR, no murmurs, rubs or gallops  Abd: reducible ventral hernia that is apparent when patient is standing. Otherwise abdomen is soft, no guarding, no rigidity, no rebound tenderness

## 2024-01-25 NOTE — PATIENT PROFILE ADULT - HISTORY OF COVID-19 VACCINATION
No
In summary this is a 11-year-old female who presents with chief complaint of ear pain, right eye redness.  Patient is afebrile on arrival.  She had tachycardia on triage vitals, however on auscultation heart rate is around 100.  She has a right sided otitis media on exam.  She also has a mild right eye conjunctivitis.  She otherwise appears well, nontoxic, well-hydrated, interactive, good tone.  She has no meningeal signs.  I have no concern for serious bacterial infection or meningitis at this time.  Possible viral syndrome as cause of her symptoms, however will treat with antibiotics.  Will give amoxicillin, and Polytrim.  Will send to pharmacy.  Also give a dose of Tylenol for pain.  Recommend close follow-up with PCP.  Strict turn precautions given for any worsening.  Parent verbalizes understanding and agrees with plan this time.

## 2024-01-27 NOTE — ED ADULT TRIAGE NOTE - ESI TRIAGE ACUITY LEVEL, MLM
MRM 3 SURG TELE  EMERGENCY DEPARTMENT ENCOUNTER       Pt Name: Shari Schultz  MRN: [id number]  Birthdate 1987  Date of evaluation: 1/27/2024  Provider: Belkis Liz DO   PCP: Stoney Simmons MD  Note Started: 11:36 PM 1/27/24     CHIEF COMPLAINT       Chief Complaint   Patient presents with    Abdominal Pain     Pt arrives ambulatory to triage, reports mid upper abd starting Thursday night that moves front to back and L to R at times. Hx of IBS. Denies N/V/D. [facility] prior to coming to ED        HISTORY OF PRESENT ILLNESS: 1 or more elements      History From: Patient  None     Shari Schultz is a 36 y.o. female who presents with cc of generalized abdominal pain, worse in the epigastric region.Reports it started two nights ago, occurred in the middle of hte night and felt like a stabbing cramping pain. It improved throughout the day after several hours but then got worse at night last night again. No associated fever, vomiting or nausea. Hx of IBS but symptoms do not feel similar.      Nursing Notes were all reviewed and agreed with or any disagreements were addressed in the HPI.       PAST HISTORY     Past Medical History:  No past medical history on file.      Past Surgical History:  No past surgical history on file.    Family History:  No family history on file.    Social History:  Social History     Tobacco Use    Smoking status: Never    Smokeless tobacco: Never   Vaping Use    Vaping Use: Never used   Substance Use Topics    Alcohol use: Never    Drug use: Never       Allergies:  No Known Allergies    CURRENT MEDICATIONS      There are no discharge medications for this patient.        PHYSICAL EXAM      ED Triage Vitals [01/27/24 1230]   Enc Vitals Group      BP (!) 150/96      Pulse 68      Respirations 18      Temp 97.9 °F (36.6 °C)      Temp Source Oral      SpO2 98 %      Weight - Scale 80.6 kg (177 lb 11.1 oz)      Height 1.727 m (5' 8\")      Head Circumference       Peak Flow  
3

## 2024-03-21 ENCOUNTER — EMERGENCY (EMERGENCY)
Facility: HOSPITAL | Age: 79
LOS: 1 days | Discharge: HOME | End: 2024-03-21
Attending: STUDENT IN AN ORGANIZED HEALTH CARE EDUCATION/TRAINING PROGRAM
Payer: MEDICARE

## 2024-03-21 VITALS
HEIGHT: 62 IN | TEMPERATURE: 98 F | OXYGEN SATURATION: 94 % | HEART RATE: 115 BPM | SYSTOLIC BLOOD PRESSURE: 183 MMHG | RESPIRATION RATE: 14 BRPM | DIASTOLIC BLOOD PRESSURE: 101 MMHG | WEIGHT: 132.28 LBS

## 2024-03-21 VITALS
DIASTOLIC BLOOD PRESSURE: 82 MMHG | OXYGEN SATURATION: 94 % | TEMPERATURE: 98 F | RESPIRATION RATE: 15 BRPM | SYSTOLIC BLOOD PRESSURE: 132 MMHG | HEART RATE: 97 BPM

## 2024-03-21 DIAGNOSIS — Z98.890 OTHER SPECIFIED POSTPROCEDURAL STATES: Chronic | ICD-10-CM

## 2024-03-21 DIAGNOSIS — Z90.710 ACQUIRED ABSENCE OF BOTH CERVIX AND UTERUS: Chronic | ICD-10-CM

## 2024-03-21 LAB
ALBUMIN SERPL ELPH-MCNC: 3.4 G/DL — LOW (ref 3.5–5)
ALP SERPL-CCNC: 83 U/L — SIGNIFICANT CHANGE UP (ref 40–120)
ALT FLD-CCNC: 19 U/L DA — SIGNIFICANT CHANGE UP (ref 10–60)
ANION GAP SERPL CALC-SCNC: 6 MMOL/L — SIGNIFICANT CHANGE UP (ref 5–17)
APPEARANCE UR: CLEAR — SIGNIFICANT CHANGE UP
AST SERPL-CCNC: 23 U/L — SIGNIFICANT CHANGE UP (ref 10–40)
BACTERIA # UR AUTO: ABNORMAL /HPF
BASOPHILS # BLD AUTO: 0.02 K/UL — SIGNIFICANT CHANGE UP (ref 0–0.2)
BASOPHILS NFR BLD AUTO: 0.4 % — SIGNIFICANT CHANGE UP (ref 0–2)
BILIRUB SERPL-MCNC: 0.3 MG/DL — SIGNIFICANT CHANGE UP (ref 0.2–1.2)
BILIRUB UR-MCNC: NEGATIVE — SIGNIFICANT CHANGE UP
BUN SERPL-MCNC: 13 MG/DL — SIGNIFICANT CHANGE UP (ref 7–18)
CALCIUM SERPL-MCNC: 9.6 MG/DL — SIGNIFICANT CHANGE UP (ref 8.4–10.5)
CHLORIDE SERPL-SCNC: 106 MMOL/L — SIGNIFICANT CHANGE UP (ref 96–108)
CO2 SERPL-SCNC: 26 MMOL/L — SIGNIFICANT CHANGE UP (ref 22–31)
COLOR SPEC: YELLOW — SIGNIFICANT CHANGE UP
CREAT SERPL-MCNC: 0.48 MG/DL — LOW (ref 0.5–1.3)
DIFF PNL FLD: ABNORMAL
EGFR: 97 ML/MIN/1.73M2 — SIGNIFICANT CHANGE UP
EOSINOPHIL # BLD AUTO: 0.06 K/UL — SIGNIFICANT CHANGE UP (ref 0–0.5)
EOSINOPHIL NFR BLD AUTO: 1.1 % — SIGNIFICANT CHANGE UP (ref 0–6)
EPI CELLS # UR: PRESENT
GLUCOSE SERPL-MCNC: 99 MG/DL — SIGNIFICANT CHANGE UP (ref 70–99)
GLUCOSE UR QL: NEGATIVE MG/DL — SIGNIFICANT CHANGE UP
HCT VFR BLD CALC: 40.2 % — SIGNIFICANT CHANGE UP (ref 34.5–45)
HGB BLD-MCNC: 13.4 G/DL — SIGNIFICANT CHANGE UP (ref 11.5–15.5)
IMM GRANULOCYTES NFR BLD AUTO: 0 % — SIGNIFICANT CHANGE UP (ref 0–0.9)
KETONES UR-MCNC: NEGATIVE MG/DL — SIGNIFICANT CHANGE UP
LEUKOCYTE ESTERASE UR-ACNC: ABNORMAL
LYMPHOCYTES # BLD AUTO: 1.32 K/UL — SIGNIFICANT CHANGE UP (ref 1–3.3)
LYMPHOCYTES # BLD AUTO: 23.8 % — SIGNIFICANT CHANGE UP (ref 13–44)
MAGNESIUM SERPL-MCNC: 2 MG/DL — SIGNIFICANT CHANGE UP (ref 1.6–2.6)
MCHC RBC-ENTMCNC: 31.8 PG — SIGNIFICANT CHANGE UP (ref 27–34)
MCHC RBC-ENTMCNC: 33.3 GM/DL — SIGNIFICANT CHANGE UP (ref 32–36)
MCV RBC AUTO: 95.5 FL — SIGNIFICANT CHANGE UP (ref 80–100)
MONOCYTES # BLD AUTO: 0.5 K/UL — SIGNIFICANT CHANGE UP (ref 0–0.9)
MONOCYTES NFR BLD AUTO: 9 % — SIGNIFICANT CHANGE UP (ref 2–14)
NEUTROPHILS # BLD AUTO: 3.64 K/UL — SIGNIFICANT CHANGE UP (ref 1.8–7.4)
NEUTROPHILS NFR BLD AUTO: 65.7 % — SIGNIFICANT CHANGE UP (ref 43–77)
NITRITE UR-MCNC: NEGATIVE — SIGNIFICANT CHANGE UP
NRBC # BLD: 0 /100 WBCS — SIGNIFICANT CHANGE UP (ref 0–0)
NT-PROBNP SERPL-SCNC: 116 PG/ML — SIGNIFICANT CHANGE UP (ref 0–450)
PH UR: 7 — SIGNIFICANT CHANGE UP (ref 5–8)
PLATELET # BLD AUTO: 226 K/UL — SIGNIFICANT CHANGE UP (ref 150–400)
POTASSIUM SERPL-MCNC: 3.8 MMOL/L — SIGNIFICANT CHANGE UP (ref 3.5–5.3)
POTASSIUM SERPL-SCNC: 3.8 MMOL/L — SIGNIFICANT CHANGE UP (ref 3.5–5.3)
PROT SERPL-MCNC: 7.1 G/DL — SIGNIFICANT CHANGE UP (ref 6–8.3)
PROT UR-MCNC: NEGATIVE MG/DL — SIGNIFICANT CHANGE UP
RBC # BLD: 4.21 M/UL — SIGNIFICANT CHANGE UP (ref 3.8–5.2)
RBC # FLD: 13 % — SIGNIFICANT CHANGE UP (ref 10.3–14.5)
RBC CASTS # UR COMP ASSIST: 5 /HPF — HIGH (ref 0–4)
SODIUM SERPL-SCNC: 138 MMOL/L — SIGNIFICANT CHANGE UP (ref 135–145)
SP GR SPEC: 1.01 — SIGNIFICANT CHANGE UP (ref 1–1.03)
TROPONIN I, HIGH SENSITIVITY RESULT: 13.5 NG/L — SIGNIFICANT CHANGE UP
UROBILINOGEN FLD QL: 0.2 MG/DL — SIGNIFICANT CHANGE UP (ref 0.2–1)
WBC # BLD: 5.54 K/UL — SIGNIFICANT CHANGE UP (ref 3.8–10.5)
WBC # FLD AUTO: 5.54 K/UL — SIGNIFICANT CHANGE UP (ref 3.8–10.5)
WBC UR QL: 10 /HPF — HIGH (ref 0–5)

## 2024-03-21 PROCEDURE — 87086 URINE CULTURE/COLONY COUNT: CPT

## 2024-03-21 PROCEDURE — 70450 CT HEAD/BRAIN W/O DYE: CPT | Mod: MC

## 2024-03-21 PROCEDURE — 83735 ASSAY OF MAGNESIUM: CPT

## 2024-03-21 PROCEDURE — 84484 ASSAY OF TROPONIN QUANT: CPT

## 2024-03-21 PROCEDURE — 99285 EMERGENCY DEPT VISIT HI MDM: CPT | Mod: 25

## 2024-03-21 PROCEDURE — 80053 COMPREHEN METABOLIC PANEL: CPT

## 2024-03-21 PROCEDURE — 93005 ELECTROCARDIOGRAM TRACING: CPT

## 2024-03-21 PROCEDURE — 81001 URINALYSIS AUTO W/SCOPE: CPT

## 2024-03-21 PROCEDURE — 85025 COMPLETE CBC W/AUTO DIFF WBC: CPT

## 2024-03-21 PROCEDURE — 70450 CT HEAD/BRAIN W/O DYE: CPT | Mod: 26,MC

## 2024-03-21 PROCEDURE — 83880 ASSAY OF NATRIURETIC PEPTIDE: CPT

## 2024-03-21 PROCEDURE — 99285 EMERGENCY DEPT VISIT HI MDM: CPT

## 2024-03-21 RX ORDER — CLOPIDOGREL BISULFATE 75 MG/1
1 TABLET, FILM COATED ORAL
Qty: 14 | Refills: 0
Start: 2024-03-21

## 2024-03-21 RX ORDER — ACETAMINOPHEN 500 MG
1000 TABLET ORAL ONCE
Refills: 0 | Status: COMPLETED | OUTPATIENT
Start: 2024-03-21 | End: 2024-03-21

## 2024-03-21 RX ORDER — METOCLOPRAMIDE HCL 10 MG
10 TABLET ORAL ONCE
Refills: 0 | Status: COMPLETED | OUTPATIENT
Start: 2024-03-21 | End: 2024-03-21

## 2024-03-21 NOTE — ED PROVIDER NOTE - CLINICAL SUMMARY MEDICAL DECISION MAKING FREE TEXT BOX
78-year-old female presenting with elevated blood pressure.  Patient reporting headache and possible vision changes.  Patient does not appear to be following up with her primary care doctor or have a cardiologist as she states he is not taking any of her medications.  Will obtain labs, EKG and CT head to assess for any endorgan damage.

## 2024-03-21 NOTE — ED PROVIDER NOTE - NSFOLLOWUPINSTRUCTIONS_ED_ALL_ED_FT
Please follow-up with urology and cardiology within 1 week.          ACUTE HEADACHE - Discharge Care    Acute Headache    WHAT YOU NEED TO KNOW:    An acute headache is pain or discomfort that may start suddenly and get worse quickly. You may have an acute headache only when you feel stress or eat certain foods. Other acute headache pain can happen every day, and sometimes several times a day.  Headache Types    DISCHARGE INSTRUCTIONS:    Seek care immediately if:    You have severe pain.    You have numbness or weakness on one side of your face or body.    You have a headache that occurs after a blow to the head, a fall, or other trauma.    You have a headache, are forgetful or confused, or have trouble speaking.    You have a headache, stiff neck, and a fever.  Call your doctor if:    You have a constant headache and are vomiting.    You have a headache each day that does not get better, even after treatment.    You have changes in your headaches, or new symptoms that occur when you have a headache.    You have questions or concerns about your condition or care.  Medicines: You may need any of the following:    Prescription pain medicine may be given. The medicine your healthcare provider recommends will depend on the kind of headaches you have. You will need to take prescription headache medicines as directed to prevent a problem called rebound headache. These headaches happen with regular use of pain relievers for headache disorders.    NSAIDs, such as ibuprofen, help decrease swelling, pain, and fever. This medicine is available with or without a doctor's order. NSAIDs can cause stomach bleeding or kidney problems in certain people. If you take blood thinner medicine, always ask your healthcare provider if NSAIDs are safe for you. Always read the medicine label and follow directions.    Acetaminophen decreases pain and fever. It is available without a doctor's order. Ask how much to take and how often to take it. Follow directions. Read the labels of all other medicines you are using to see if they also contain acetaminophen, or ask your doctor or pharmacist. Acetaminophen can cause liver damage if not taken correctly.    Antidepressants may be given for some kinds of headaches.    Take your medicine as directed. Contact your healthcare provider if you think your medicine is not helping or if you have side effects. Tell your provider if you are allergic to any medicine. Keep a list of the medicines, vitamins, and herbs you take. Include the amounts, and when and why you take them. Bring the list or the pill bottles to follow-up visits. Carry your medicine list with you in case of an emergency.  Manage your symptoms:    Apply heat or ice on the headache area. Use a heat or ice pack. For an ice pack, you can also put crushed ice in a plastic bag. Cover the pack or bag with a towel before you apply it to your skin. Ice and heat both help decrease pain, and heat also helps decrease muscle spasms. Apply heat for 20 to 30 minutes every 2 hours. Apply ice for 15 to 20 minutes every hour. Apply heat or ice for as long and for as many days as directed. You may alternate heat and ice.    Relax your muscles. Lie down in a comfortable position and close your eyes. Relax your muscles slowly. Start at your toes and work your way up your body.    Keep a record of your headaches. Write down when your headaches start and stop. Include your symptoms and what you were doing when the headache began. Record what you ate or drank for 24 hours before the headache started. Describe the pain and where it hurts. Keep track of what you did to treat your headache and if it worked.  Prevent an acute headache:    Avoid anything that triggers an acute headache. Examples include exposure to chemicals, going to high altitude, or not getting enough sleep. Create a regular sleep routine. Go to sleep at the same time and wake up at the same time each day. Do not use electronic devices before bedtime. These may trigger a headache or prevent you from sleeping well.    Do not smoke. Nicotine and other chemicals in cigarettes and cigars can trigger an acute headache or make it worse. Ask your healthcare provider for information if you currently smoke and need help to quit. E-cigarettes or smokeless tobacco still contain nicotine. Talk to your healthcare provider before you use these products.    Limit alcohol as directed. Alcohol can trigger an acute headache or make it worse. If you have cluster headaches, do not drink alcohol during an episode. For other types of headaches, ask your healthcare provider if it is safe for you to drink alcohol. Ask how much is safe for you to drink, and how often.    Exercise as directed. Exercise can reduce tension and help with headache pain. Aim for 30 minutes of physical activity on most days of the week. Your healthcare provider can help you create an exercise plan.    Eat a variety of healthy foods. Healthy foods include fruits, vegetables, low-fat dairy products, lean meats, fish, whole grains, and cooked beans. Your healthcare provider or dietitian can help you create meals plans if you need to avoid foods that trigger headaches.  Follow up with your healthcare provider as directed: Bring your headache record with you when you see your healthcare provider. Write down your questions so you remember to ask them during your visits.    © [employer] 1973, 2024    	  back to top            © [employer] 1973, 2024

## 2024-03-21 NOTE — ED ADULT NURSE NOTE - OBJECTIVE STATEMENT
Patient c/o high blood pressure, difficulty urinating & intermittent constipation. Patient denies chest pain, SOB, NVD, fever or pain.

## 2024-03-21 NOTE — ED PROVIDER NOTE - PHYSICAL EXAMINATION
General: well appearing female, no acute distress   HEENT: normocephalic, atraumatic   Respiratory: normal work of breathing   Cardiac: regular rate and rhythm   Abdomen: soft, non-tender, no guarding or rebound   MSK: no swelling or tenderness of lower extremities, moving all extremities spontaneously   Skin: warm, dry   Neuro: A&Ox3  Psych: appropriate affect

## 2024-03-21 NOTE — ED PROVIDER NOTE - OBJECTIVE STATEMENT
78F, pmh of  NSTEMI, CAD (1 stent), HTN, and known abdominal hernia, Presenting with elevated blood pressure.  Patient reports she has headache and feels like she has blurry vision.  Denies any chest pain or trouble breathing.  Reports she has not seen a cardiologist has not been taking any of her heart medication.  Reports she did not know that she had a stent in her heart.  Also reporting abdominal pain due to her hernia and reporting that she cannot urinate.

## 2024-03-21 NOTE — ED PROVIDER NOTE - NSFOLLOWUPCLINICS_GEN_ALL_ED_FT
[facility]  Cardiology  [address] [facility], [address]  [place] [address]  Phone: [phone]  Fax:     [facility]  Urology  [address] [place] [address]  Phone: [phone]  Fax: [phone]

## 2024-03-21 NOTE — ED PROVIDER NOTE - NSPTACCESSSVCSAPPTDETAILS_ED_ALL_ED_FT
Urology and cardiology within 1 week, for incomplete bladder emptying and cardiology for follow-up after she had coronary artery stent placed and has not been seeing a cardiologist.

## 2024-03-21 NOTE — ED PROVIDER NOTE - PROGRESS NOTE DETAILS
[place]:  Patient received in signout from Dr. Her, pending labs, EKG, bladder scan.  Labs are unremarkable EKG was performed at 1:30 PM and appears normal.  Patient was able to urinate spontaneously and about 1 hour later approximately the nurse was able to perform a bladder scan which showed 418 mL of urine in the bladder.  Bladder is nondistended and with small amount of urine in the bladder, Dupont catheter is not indicated however advised patient that she will need follow-up with urology in the next few days, will place request for care coordinator for urology follow-up.  Will also request cardiology follow-up this patient has stent placed and was not following up with cardiology and does not know who her cardiologist is.  Return precautions were given.

## 2024-03-23 LAB
CULTURE RESULTS: SIGNIFICANT CHANGE UP
SPECIMEN SOURCE: SIGNIFICANT CHANGE UP

## 2024-03-27 ENCOUNTER — APPOINTMENT (OUTPATIENT)
Dept: UROLOGY | Facility: CLINIC | Age: 79
End: 2024-03-27
Payer: MEDICARE

## 2024-03-27 VITALS
TEMPERATURE: 97.7 F | WEIGHT: 132 LBS | DIASTOLIC BLOOD PRESSURE: 95 MMHG | HEIGHT: 62 IN | SYSTOLIC BLOOD PRESSURE: 159 MMHG | HEART RATE: 122 BPM | BODY MASS INDEX: 24.29 KG/M2 | OXYGEN SATURATION: 93 %

## 2024-03-27 DIAGNOSIS — R33.9 RETENTION OF URINE, UNSPECIFIED: ICD-10-CM

## 2024-03-27 DIAGNOSIS — K42.9 UMBILICAL HERNIA W/OUT OBSTRUCTION OR GANGRENE: ICD-10-CM

## 2024-03-27 PROCEDURE — 99204 OFFICE O/P NEW MOD 45 MIN: CPT

## 2024-03-27 PROCEDURE — G2211 COMPLEX E/M VISIT ADD ON: CPT

## 2024-03-27 NOTE — PHYSICAL EXAM
[Normal Appearance] : normal appearance [General Appearance - In No Acute Distress] : no acute distress [Well Groomed] : well groomed [Edema] : no peripheral edema [Respiration, Rhythm And Depth] : normal respiratory rhythm and effort [Abdomen Soft] : soft [Exaggerated Use Of Accessory Muscles For Inspiration] : no accessory muscle use [Abdomen Tenderness] : non-tender [Costovertebral Angle Tenderness] : no ~M costovertebral angle tenderness [Urinary Bladder Findings] : the bladder was normal on palpation [Normal Station and Gait] : the gait and station were normal for the patient's age [No Focal Deficits] : no focal deficits [] : no rash [Affect] : the affect was normal [Oriented To Time, Place, And Person] : oriented to person, place, and time [Mood] : the mood was normal [No Palpable Adenopathy] : no palpable adenopathy

## 2024-03-28 ENCOUNTER — APPOINTMENT (OUTPATIENT)
Dept: CARDIOLOGY | Facility: CLINIC | Age: 79
End: 2024-03-28
Payer: MEDICARE

## 2024-03-28 ENCOUNTER — NON-APPOINTMENT (OUTPATIENT)
Age: 79
End: 2024-03-28

## 2024-03-28 VITALS — SYSTOLIC BLOOD PRESSURE: 114 MMHG | HEART RATE: 108 BPM | DIASTOLIC BLOOD PRESSURE: 70 MMHG

## 2024-03-28 VITALS
OXYGEN SATURATION: 95 % | WEIGHT: 132 LBS | BODY MASS INDEX: 24.29 KG/M2 | HEART RATE: 116 BPM | DIASTOLIC BLOOD PRESSURE: 76 MMHG | SYSTOLIC BLOOD PRESSURE: 152 MMHG | HEIGHT: 62 IN | TEMPERATURE: 97.2 F

## 2024-03-28 DIAGNOSIS — R00.0 TACHYCARDIA, UNSPECIFIED: ICD-10-CM

## 2024-03-28 DIAGNOSIS — Z87.891 PERSONAL HISTORY OF NICOTINE DEPENDENCE: ICD-10-CM

## 2024-03-28 DIAGNOSIS — R06.02 SHORTNESS OF BREATH: ICD-10-CM

## 2024-03-28 DIAGNOSIS — Z87.898 PERSONAL HISTORY OF OTHER SPECIFIED CONDITIONS: ICD-10-CM

## 2024-03-28 DIAGNOSIS — I25.2 OLD MYOCARDIAL INFARCTION: ICD-10-CM

## 2024-03-28 DIAGNOSIS — Z95.5 PRESENCE OF CORONARY ANGIOPLASTY IMPLANT AND GRAFT: ICD-10-CM

## 2024-03-28 LAB
APPEARANCE: CLEAR
BACTERIA: NEGATIVE /HPF
BILIRUBIN URINE: NEGATIVE
BLOOD URINE: ABNORMAL
CAST: 0 /LPF
COLOR: YELLOW
EPITHELIAL CELLS: 0 /HPF
GLUCOSE QUALITATIVE U: NEGATIVE MG/DL
KETONES URINE: NEGATIVE MG/DL
LEUKOCYTE ESTERASE URINE: ABNORMAL
MICROSCOPIC-UA: NORMAL
NITRITE URINE: NEGATIVE
PH URINE: 7
PROTEIN URINE: NEGATIVE MG/DL
RED BLOOD CELLS URINE: 1 /HPF
REVIEW: NORMAL
SPECIFIC GRAVITY URINE: 1.01
UROBILINOGEN URINE: 0.2 MG/DL
WHITE BLOOD CELLS URINE: 1 /HPF

## 2024-03-28 PROCEDURE — 99204 OFFICE O/P NEW MOD 45 MIN: CPT

## 2024-03-28 PROCEDURE — 93000 ELECTROCARDIOGRAM COMPLETE: CPT

## 2024-03-28 PROCEDURE — 93306 TTE W/DOPPLER COMPLETE: CPT

## 2024-03-28 RX ORDER — ASPIRIN ENTERIC COATED TABLETS 81 MG 81 MG/1
81 TABLET, DELAYED RELEASE ORAL DAILY
Qty: 30 | Refills: 3 | Status: ACTIVE | COMMUNITY
Start: 2024-03-28 | End: 1900-01-01

## 2024-03-28 RX ORDER — METOPROLOL SUCCINATE 25 MG/1
25 TABLET, EXTENDED RELEASE ORAL DAILY
Qty: 30 | Refills: 3 | Status: ACTIVE | COMMUNITY
Start: 2024-03-28 | End: 1900-01-01

## 2024-03-28 NOTE — HISTORY OF PRESENT ILLNESS
[FreeTextEntry1] : Bernadette Mcqueen is a 78- year=- old female with history of MI in 5/2022, s/p coronary stent to proximal LAD at the same time comes for cardiac evaluation. Did not follow up with any Cardiologist since stent done. Not taking any medications. Denies any chest pain or palpitations. Complaining of shortness of breath on exertion which is relieved with rest in few minutes. Says follows up with PCP. Feels very nervous. Says BP high when nervous.

## 2024-03-28 NOTE — DISCUSSION/SUMMARY
[FreeTextEntry1] : In a summary Bernadette Mcqueen is an- elderly- female with CAD s/p stent, started on Aspirin 81 mg once daily. Get lipid profile and start statins as per LDL level. Sinus tachycardia, started on Metoprolol ER 25 mg once daily. CAD and shortness of breath, Echo done showed normal LV systolic function. Echo results explained. Pharmacological nuclear stress test to rule out ischemia. Further plan based on Stress test results. Follow up in 6 weeks.

## 2024-03-28 NOTE — REVIEW OF SYSTEMS
[Blurry Vision] : no blurred vision [Dyspnea on exertion] : dyspnea during exertion [Chest Discomfort] : no chest discomfort [Lower Ext Edema] : no extremity edema [Palpitations] : no palpitations [Orthopnea] : no orthopnea [PND] : no PND [Abdominal Pain] : no abdominal pain [Nausea] : no nausea [Vomiting] : no vomiting [Heartburn] : no heartburn [Joint Pain] : joint pain [Rash] : no rash [Itching] : no itching [Dizziness] : no dizziness [Tremor] : no tremor was seen [Numbness (Hypoesthesia)] : no numbness [Tingling (Paresthesia)] : no tingling [Confusion] : no confusion was observed [Anxiety] : anxiety [Under Stress] : not under stress [Easy Bleeding] : no tendency for easy bleeding [Easy Bruising] : no tendency for easy bruising [Negative] : Respiratory

## 2024-03-29 LAB — BACTERIA UR CULT: NORMAL

## 2024-04-02 ENCOUNTER — APPOINTMENT (OUTPATIENT)
Dept: FAMILY MEDICINE | Facility: CLINIC | Age: 79
End: 2024-04-02

## 2024-04-04 ENCOUNTER — RESULT REVIEW (OUTPATIENT)
Age: 79
End: 2024-04-04

## 2024-04-04 ENCOUNTER — APPOINTMENT (OUTPATIENT)
Dept: CV DIAGNOSTICS | Facility: HOSPITAL | Age: 79
End: 2024-04-04

## 2024-04-04 ENCOUNTER — OUTPATIENT (OUTPATIENT)
Dept: OUTPATIENT SERVICES | Facility: HOSPITAL | Age: 79
LOS: 1 days | End: 2024-04-04
Payer: MEDICARE

## 2024-04-04 DIAGNOSIS — Z98.890 OTHER SPECIFIED POSTPROCEDURAL STATES: Chronic | ICD-10-CM

## 2024-04-04 DIAGNOSIS — Z90.710 ACQUIRED ABSENCE OF BOTH CERVIX AND UTERUS: Chronic | ICD-10-CM

## 2024-04-04 DIAGNOSIS — R06.02 SHORTNESS OF BREATH: ICD-10-CM

## 2024-04-04 DIAGNOSIS — I25.10 ATHEROSCLEROTIC HEART DISEASE OF NATIVE CORONARY ARTERY WITHOUT ANGINA PECTORIS: ICD-10-CM

## 2024-04-04 PROCEDURE — 93016 CV STRESS TEST SUPVJ ONLY: CPT | Mod: GC,MC

## 2024-04-04 PROCEDURE — 78451 HT MUSCLE IMAGE SPECT SING: CPT | Mod: 26,MC

## 2024-04-04 PROCEDURE — 93018 CV STRESS TEST I&R ONLY: CPT | Mod: GC,MC

## 2024-04-15 ENCOUNTER — APPOINTMENT (OUTPATIENT)
Dept: UROLOGY | Facility: CLINIC | Age: 79
End: 2024-04-15

## 2024-04-24 NOTE — H&P ADULT - RESPIRATORY AND THORAX
Patient phoned and is trying to schedule her MRI but is claustrophobic and would like to try something to take prior to the procedure otherwise she may need an open MRI .    details…

## 2024-05-08 ENCOUNTER — APPOINTMENT (OUTPATIENT)
Dept: CARDIOLOGY | Facility: CLINIC | Age: 79
End: 2024-05-08

## 2024-05-30 ENCOUNTER — NON-APPOINTMENT (OUTPATIENT)
Age: 79
End: 2024-05-30

## 2024-05-30 ENCOUNTER — APPOINTMENT (OUTPATIENT)
Dept: CARDIOLOGY | Facility: CLINIC | Age: 79
End: 2024-05-30
Payer: MEDICARE

## 2024-05-30 VITALS
OXYGEN SATURATION: 94 % | BODY MASS INDEX: 25.21 KG/M2 | DIASTOLIC BLOOD PRESSURE: 78 MMHG | HEART RATE: 116 BPM | SYSTOLIC BLOOD PRESSURE: 122 MMHG | WEIGHT: 137 LBS | TEMPERATURE: 98.7 F | HEIGHT: 62 IN

## 2024-05-30 DIAGNOSIS — I10 ESSENTIAL (PRIMARY) HYPERTENSION: ICD-10-CM

## 2024-05-30 DIAGNOSIS — Z01.810 ENCOUNTER FOR PREPROCEDURAL CARDIOVASCULAR EXAMINATION: ICD-10-CM

## 2024-05-30 DIAGNOSIS — I25.10 ATHEROSCLEROTIC HEART DISEASE OF NATIVE CORONARY ARTERY W/OUT ANGINA PECTORIS: ICD-10-CM

## 2024-05-30 PROCEDURE — 93000 ELECTROCARDIOGRAM COMPLETE: CPT | Mod: NC

## 2024-05-30 PROCEDURE — 99214 OFFICE O/P EST MOD 30 MIN: CPT

## 2024-05-31 NOTE — DISCUSSION/SUMMARY
[FreeTextEntry1] : In a summary Bernadette Mcqueen is an- elderly- female with CAD s/p stent, continue Aspirin 81 mg once daily. Sinus tachycardia and CAD, continue Metoprolol ER 25 mg once daily. CAD and shortness of breath, cardiac work up is negative for ischemia. Sinus tachycardia may be secondary to coffee and soda. Ms. Santos was explained in detail to stop drinking coffee and sodas. Drink plenty of water. Pre- procedural cardiac evaluation for colonoscopy, will be low risk cardiac albert. Spoke with Dr. Andrei Silveira (GI) about Ms. Santos. Follow up in 3 months. Spent 30 minutes on encounter.  None Done

## 2024-05-31 NOTE — REVIEW OF SYSTEMS
[Joint Pain] : joint pain [Anxiety] : anxiety [Negative] : Respiratory [Blurry Vision] : no blurred vision [Dyspnea on exertion] : dyspnea during exertion [Chest Discomfort] : no chest discomfort [Lower Ext Edema] : no extremity edema [Palpitations] : no palpitations [Orthopnea] : no orthopnea [PND] : no PND [Abdominal Pain] : no abdominal pain [Nausea] : no nausea [Vomiting] : no vomiting [Heartburn] : no heartburn [Rash] : no rash [Itching] : no itching [Dizziness] : no dizziness [Tremor] : no tremor was seen [Numbness (Hypoesthesia)] : no numbness [Tingling (Paresthesia)] : no tingling [Confusion] : no confusion was observed [Under Stress] : not under stress [Easy Bleeding] : no tendency for easy bleeding [Easy Bruising] : no tendency for easy bruising

## 2024-05-31 NOTE — HISTORY OF PRESENT ILLNESS
[FreeTextEntry1] : Bernadette Mcqueen is a 78- year=- old female with history of MI in 5/2022, s/p coronary stent to proximal LAD at the same time comes for pre- procedural cardiac evaluation for colonoscopy.  Denies any chest pain or palpitations. Complaining of mild chronic shortness of breath on exertion which is relieved with rest in few minutes for which Echo done showed normal LV systolic function and nuclear stress test is negative for ischemia. Says follows up with PCP. Says compliant to medications. Drinks coffee and sodas.

## 2024-08-09 ENCOUNTER — NON-APPOINTMENT (OUTPATIENT)
Age: 79
End: 2024-08-09

## 2024-08-09 ENCOUNTER — APPOINTMENT (OUTPATIENT)
Dept: CARDIOLOGY | Facility: CLINIC | Age: 79
End: 2024-08-09

## 2024-08-09 PROBLEM — Z01.810 PRE-OPERATIVE CARDIOVASCULAR EXAMINATION: Status: ACTIVE | Noted: 2024-08-09

## 2024-08-09 PROCEDURE — 93000 ELECTROCARDIOGRAM COMPLETE: CPT | Mod: NC

## 2024-08-09 PROCEDURE — 99214 OFFICE O/P EST MOD 30 MIN: CPT

## 2024-08-09 NOTE — REVIEW OF SYSTEMS
[Dyspnea on exertion] : dyspnea during exertion [Joint Pain] : joint pain [Anxiety] : anxiety [Negative] : Respiratory [Blurry Vision] : no blurred vision [Chest Discomfort] : no chest discomfort [Lower Ext Edema] : no extremity edema [Palpitations] : no palpitations [Orthopnea] : no orthopnea [PND] : no PND [Abdominal Pain] : no abdominal pain [Nausea] : no nausea [Vomiting] : no vomiting [Heartburn] : no heartburn [Rash] : no rash [Itching] : no itching [Dizziness] : no dizziness [Tremor] : no tremor was seen [Numbness (Hypoesthesia)] : no numbness [Tingling (Paresthesia)] : no tingling [Confusion] : no confusion was observed [Under Stress] : not under stress [Easy Bleeding] : no tendency for easy bleeding [Easy Bruising] : no tendency for easy bruising

## 2024-08-09 NOTE — DISCUSSION/SUMMARY
[FreeTextEntry1] : In a summary Bernadette Mcqueen is an- elderly- female with CAD s/p stent, continue Aspirin 81 mg once daily. Sinus tachycardia and CAD, continue Metoprolol ER 25 mg once daily. CAD and shortness of breath, cardiac work up is negative for ischemia. Sinus tachycardia may be secondary to coffee and soda. Ms. Santos was explained in detail to stop drinking coffee and sodas. Drink plenty of water. Pre- op cardiac evaluation for Umbilical hernia repair will be low risk cardiac wise. Discussed with Ms. Santos. Follow up in 3 months. Spent 30 minutes on encounter.

## 2024-08-09 NOTE — HISTORY OF PRESENT ILLNESS
[FreeTextEntry1] : Bernadette Mcqueen is a 78- year=- old female with history of MI in 5/2022, s/p coronary stent to proximal LAD at the same time comes for pre- op cardiac evaluation for Umbilical hernia repair Denies any chest pain or palpitations. Complaining of mild chronic shortness of breath on exertion which is relieved with rest in few minutes for which Echo done showed normal LV systolic function and nuclear stress test is negative for ischemia. Says follows up with PCP. Says compliant to medications. Drinks coffee and sodas.

## 2024-09-09 ENCOUNTER — APPOINTMENT (OUTPATIENT)
Dept: CARDIOLOGY | Facility: CLINIC | Age: 79
End: 2024-09-09

## 2024-11-18 ENCOUNTER — APPOINTMENT (OUTPATIENT)
Dept: CARDIOLOGY | Facility: CLINIC | Age: 79
End: 2024-11-18

## 2025-03-07 ENCOUNTER — APPOINTMENT (OUTPATIENT)
Dept: CARDIOLOGY | Facility: CLINIC | Age: 80
End: 2025-03-07
Payer: MEDICARE

## 2025-03-07 ENCOUNTER — NON-APPOINTMENT (OUTPATIENT)
Age: 80
End: 2025-03-07

## 2025-03-07 VITALS
OXYGEN SATURATION: 99 % | SYSTOLIC BLOOD PRESSURE: 137 MMHG | DIASTOLIC BLOOD PRESSURE: 79 MMHG | TEMPERATURE: 97 F | HEART RATE: 100 BPM | HEIGHT: 62 IN

## 2025-03-07 DIAGNOSIS — I25.2 OLD MYOCARDIAL INFARCTION: ICD-10-CM

## 2025-03-07 DIAGNOSIS — I10 ESSENTIAL (PRIMARY) HYPERTENSION: ICD-10-CM

## 2025-03-07 PROCEDURE — 93000 ELECTROCARDIOGRAM COMPLETE: CPT

## 2025-03-07 PROCEDURE — 99214 OFFICE O/P EST MOD 30 MIN: CPT

## 2025-03-07 RX ORDER — METOPROLOL SUCCINATE 50 MG/1
50 TABLET, EXTENDED RELEASE ORAL DAILY
Qty: 30 | Refills: 3 | Status: ACTIVE | COMMUNITY
Start: 2025-03-07 | End: 1900-01-01

## 2025-06-13 ENCOUNTER — APPOINTMENT (OUTPATIENT)
Dept: CARDIOLOGY | Facility: CLINIC | Age: 80
End: 2025-06-13
Payer: MEDICARE

## 2025-06-13 VITALS
SYSTOLIC BLOOD PRESSURE: 127 MMHG | DIASTOLIC BLOOD PRESSURE: 78 MMHG | OXYGEN SATURATION: 99 % | WEIGHT: 140 LBS | BODY MASS INDEX: 25.76 KG/M2 | HEART RATE: 81 BPM | HEIGHT: 62 IN | RESPIRATION RATE: 16 BRPM | TEMPERATURE: 98.3 F

## 2025-06-13 PROCEDURE — 99213 OFFICE O/P EST LOW 20 MIN: CPT

## 2025-06-13 PROCEDURE — 93000 ELECTROCARDIOGRAM COMPLETE: CPT
